# Patient Record
Sex: FEMALE | Race: WHITE | NOT HISPANIC OR LATINO | Employment: UNEMPLOYED | URBAN - METROPOLITAN AREA
[De-identification: names, ages, dates, MRNs, and addresses within clinical notes are randomized per-mention and may not be internally consistent; named-entity substitution may affect disease eponyms.]

---

## 2019-01-24 ENCOUNTER — OFFICE VISIT (OUTPATIENT)
Dept: MEDICAL GROUP | Facility: MEDICAL CENTER | Age: 44
End: 2019-01-24
Attending: FAMILY MEDICINE
Payer: COMMERCIAL

## 2019-01-24 VITALS
BODY MASS INDEX: 26.22 KG/M2 | SYSTOLIC BLOOD PRESSURE: 118 MMHG | OXYGEN SATURATION: 100 % | HEIGHT: 68 IN | WEIGHT: 173 LBS | HEART RATE: 78 BPM | RESPIRATION RATE: 16 BRPM | DIASTOLIC BLOOD PRESSURE: 80 MMHG | TEMPERATURE: 97.8 F

## 2019-01-24 DIAGNOSIS — R11.0 NAUSEA: ICD-10-CM

## 2019-01-24 DIAGNOSIS — Z72.0 TOBACCO ABUSE: ICD-10-CM

## 2019-01-24 DIAGNOSIS — G44.021 INTRACTABLE CHRONIC CLUSTER HEADACHE: ICD-10-CM

## 2019-01-24 DIAGNOSIS — G43.011 INTRACTABLE MIGRAINE WITHOUT AURA AND WITH STATUS MIGRAINOSUS: ICD-10-CM

## 2019-01-24 PROCEDURE — 99204 OFFICE O/P NEW MOD 45 MIN: CPT | Performed by: FAMILY MEDICINE

## 2019-01-24 PROCEDURE — 99202 OFFICE O/P NEW SF 15 MIN: CPT | Performed by: FAMILY MEDICINE

## 2019-01-24 RX ORDER — TRAMADOL HYDROCHLORIDE 50 MG/1
50-100 TABLET ORAL EVERY 8 HOURS PRN
Qty: 40 TAB | Refills: 0 | Status: SHIPPED | OUTPATIENT
Start: 2019-01-24 | End: 2019-01-31

## 2019-01-24 RX ORDER — IBUPROFEN 800 MG/1
400-800 TABLET ORAL EVERY 8 HOURS PRN
Qty: 60 TAB | Refills: 3 | Status: SHIPPED | OUTPATIENT
Start: 2019-01-24 | End: 2019-10-17

## 2019-01-24 RX ORDER — PROCHLORPERAZINE MALEATE 10 MG
10 TABLET ORAL EVERY 6 HOURS PRN
Qty: 30 TAB | Refills: 3 | Status: SHIPPED | OUTPATIENT
Start: 2019-01-24 | End: 2019-10-17

## 2019-01-24 RX ORDER — BUTALBITAL, ACETAMINOPHEN AND CAFFEINE 50; 325; 40 MG/1; MG/1; MG/1
1-2 TABLET ORAL EVERY 6 HOURS PRN
Qty: 60 TAB | Refills: 0 | Status: SHIPPED | OUTPATIENT
Start: 2019-01-24 | End: 2019-02-23

## 2019-01-24 ASSESSMENT — ENCOUNTER SYMPTOMS
TINGLING: 0
DEPRESSION: 0
PALPITATIONS: 0
TREMORS: 0
SPEECH CHANGE: 0
SPUTUM PRODUCTION: 0
NERVOUS/ANXIOUS: 1
INSOMNIA: 0
EYES NEGATIVE: 1
BACK PAIN: 0
HALLUCINATIONS: 0
CHILLS: 0
SHORTNESS OF BREATH: 0
SENSORY CHANGE: 0
SEIZURES: 0
HEADACHES: 1
VOMITING: 0
NAUSEA: 1
ABDOMINAL PAIN: 0
COUGH: 0
FOCAL WEAKNESS: 0
FEVER: 0
NECK PAIN: 0

## 2019-01-24 ASSESSMENT — PATIENT HEALTH QUESTIONNAIRE - PHQ9: CLINICAL INTERPRETATION OF PHQ2 SCORE: 0

## 2019-01-24 ASSESSMENT — LIFESTYLE VARIABLES: SUBSTANCE_ABUSE: 0

## 2019-01-24 NOTE — PROGRESS NOTES
Subjective:      Erna Garcia is a 43 y.o. female who presents with [facility] and Migraine            Patient 43-year-old female here to establish with the clinic today.    She has a history of intractable migraine headaches for which she had been having for the last 22 years.  Patient states that 22 years ago she had a viral meningitis while pregnant with her daughter and since then she has been having migraine headaches.  She had been seeing a neurologist for the management of her migraine headaches but has not found a medication that actually works to control her headaches.  She states that she had been placed on triptan's which worsened the intensity of the headaches.  Pain medications unless started before the headache occurred seemed to not have any effect on her headaches.  She has used Motrin with minimal success if she used multiple over-the-counter tablets.  Today we will order an MRI of her head to further assess the migraine.  She states the last MRI she had was about 20 years ago.  We will also refer to neurology for further assessment and also assistance in management.  In the meantime will have her try Fioricet as needed, Motrin 400-800 mg as needed, prochlorperazine as needed.  For breakthrough pain we will have her try tramadol since she has not tried tramadol in the past.  We will continue to follow.    Since we are ordering an MRI will also order blood work to further assess her metabolic function.  For her general health maintenance we will check a lipid panel, as well as a vitamin D level.  We will continue to follow.    She states she generally does not have any anxiety or depression, but when she is anticipating a headache she starts developing some degree of anxiety.  She does not wish to see a counselor at this point, and does not wish to be started on any medications for anxiety.  She is not having any urges to harm himself or others.  ER precautions have been given to patient.    She  has no past surgical history.    Her family medical history is significant for heart disease in her maternal grandfather and lung cancer in her maternal grandmother.    She is single but in a relationship and currently works as a .  She has a daughter.    She smokes tobacco, stopped drinking alcohol since wheat worsens her headaches, and denies other substance use.    Acute pain   This is a recurrent problem.   Patient is complaining of pain x 22 year(s) located in her headache.  Pain is intermittent, described as sharp, aching, squeezing, throbbing and a 9/10 on the pain scale.   Treatments tried include:NSAIDs, tylenol.     Is the pain medication improving the patient’s symptoms: No  Any adverse effects: No  Alcohol or illicit drug use:   She  reports that she drinks alcohol.  She  reports that she does not use drugs.     History of controlled substance used in a way other than prescribed? No     Any early refills of a controlled substance: No  History of lost or stolen controlled substance prescription: No  Any aberrant behavior or intoxication while on a controlled substance: No  Has the patient self-modified their dose or frequency of the medication :No  Compliant with treatment recommendations and plan: Yes  Any major health change to the patient: No  Concerns for misuse, abuse or addiction: No  /NarxCheck report reviewed: Yes  History of abnormal drug screening: No  I have assessed the patient’s risk for abuse, dependency, and addiction using the validated Opioid Risk Tool.     Opioid Risk Score: 1       Interpretation of Opioid Risk Score   Score 0-3 = Low risk of abuse. Do UDS at least once per year.  Score 4-7 = Moderate risk of abuse. Do UDS 1-4 times per year.  Score 8+ = High risk of abuse. Refer to specialist.     I have conducted a physical exam and documented findings.     I certify that I have obtained and reviewed her medical history. I have also made a good marek effort to obtain  "applicable records from other providers who have treated the patient.  I have reviewed the patient's prescription history as maintained by the [place] Prescription Monitoring Program.      Given the above, I believe the benefits of controlled substance therapy outweigh the risks. The reasons for prescribing controlled substances include non-narcotic, oral analgesic alternatives have been inadequate for pain control. Accordingly, I have discussed the risk and benefits, treatment plan, and alternative therapies with the patient. Patient was advised that this medicine is intended for short term (no more than 14 days)/intermittent use only and not intended to be an ongoing prescription.          Review of Systems   Constitutional: Negative for chills and fever.   HENT: Negative for hearing loss and tinnitus.    Eyes: Negative.         Light sensitivity with headache   Respiratory: Negative for cough, sputum production and shortness of breath.    Cardiovascular: Negative for chest pain and palpitations.   Gastrointestinal: Positive for nausea. Negative for abdominal pain and vomiting.   Genitourinary: Negative.    Musculoskeletal: Negative for back pain, joint pain and neck pain.   Skin: Negative for rash.   Neurological: Positive for headaches. Negative for tingling, tremors, sensory change, speech change, focal weakness and seizures.   Endo/Heme/Allergies: Negative.    Psychiatric/Behavioral: Negative for depression, hallucinations, substance abuse and suicidal ideas. The patient is nervous/anxious. The patient does not have insomnia.           Objective:     /80 (BP Location: Left arm, Patient Position: Sitting)   Pulse 78   Temp 36.6 °C (97.8 °F)   Resp 16   Ht 1.727 m (5' 8\")   Wt 78.5 kg (173 lb)   SpO2 100%   BMI 26.30 kg/m²      Physical Exam   Constitutional: She is oriented to person, place, and time. She appears well-developed and well-nourished.   HENT:   Head: Normocephalic and atraumatic. "   Nose: Nose normal.   Mouth/Throat: Oropharynx is clear and moist.   Eyes: Pupils are equal, round, and reactive to light. Conjunctivae and EOM are normal. Right eye exhibits no discharge. Left eye exhibits no discharge.   Neck: Normal range of motion. Neck supple. No thyromegaly present.   Cardiovascular: Normal rate, regular rhythm and normal heart sounds.  Exam reveals no friction rub.    No murmur heard.  Pulmonary/Chest: Effort normal and breath sounds normal. No respiratory distress. She has no wheezes. She has no rales.   Abdominal: Soft. Bowel sounds are normal. She exhibits no distension. There is no tenderness.   Musculoskeletal: Normal range of motion.   Lymphadenopathy:     She has no cervical adenopathy.   Neurological: She is alert and oriented to person, place, and time.   Skin: Skin is warm and dry.   Psychiatric: She has a normal mood and affect. Her behavior is normal.   Nursing note and vitals reviewed.              Assessment/Plan:     1. Intractable migraine without aura and with status migrainosus  We will have her referred to neurology and get an MRI of her brain.  For her headaches we will have her start Motrin at 400-800 mg every 8 hours as needed, prochlorperazine 10 mg every 6 hours as needed for her nausea, Fioricet every 6 hours as needed.  And tramadol for breakthrough pain.  ER precautions have been given to patient.  - REFERRAL TO NEUROLOGY  - MR-BRAIN-W/O; Future  - prochlorperazine (COMPAZINE) 10 MG Tab; Take 1 Tab by mouth every 6 hours as needed.  Dispense: 30 Tab; Refill: 3  - ibuprofen (MOTRIN) 800 MG Tab; Take 0.5-1 Tabs by mouth every 8 hours as needed.  Dispense: 60 Tab; Refill: 3  - tramadol (ULTRAM) 50 MG Tab; Take 1-2 Tabs by mouth every 8 hours as needed for up to 7 days.  Dispense: 40 Tab; Refill: 0  - acetaminophen/caffeine/butalbital 325-40-50 mg (FIORICET) -40 MG Tab; Take 1-2 Tabs by mouth every 6 hours as needed for Headache or Migraine for up to 30 days.   Dispense: 60 Tab; Refill: 0  - Consent for Opiate Prescription  - TSH WITH REFLEX TO FT4; Future  - COMP METABOLIC PANEL; Future  - Lipid Profile; Future  - CBC WITH DIFFERENTIAL; Future  - VITAMIN D,25 HYDROXY; Future  - Controlled Substance Treatment Agreement    2. Intractable chronic cluster headache  See above plan.  - REF[facility] NEUROLOG[facility]  [facility]  - prochlorperazine (COMPAZINE) 10 MG Tab; Take 1 Tab by mouth every 6 hours as needed.  Dispense: 30 Tab; Refill: 3  - ibuprofen (MOTRIN) 800 MG Tab; Take 0.5-1 Tabs by mouth every 8 hours as needed.  Dispense: 60 Tab; Refill: 3  - tramadol (ULTRAM) 50 MG Tab; Take 1-2 Tabs by mouth every 8 hours as needed for up to 7 days.  Dispense: 40 Tab; Refill: 0  - acetaminophen/caffeine/butalbital 325-40-50 mg (FIORICET) -40 MG Tab; Take 1-2 Tabs by mouth every 6 hours as needed for Headache or Migraine for up to 30 days.  Dispense: 60 Tab; Refill: 0  - Consent for Opiate Prescription  - TSH WITH REFLEX TO FT4; Future  - COMP METABOLIC PANEL; Future  - Lipid Profile; Future  - CBC WITH DIFFERENTIAL; Future  - VITAMIN D,25 HYDROXY; Future  - Controlled Substance Treatment Agreement    3. Nausea  See above plan.  - REFERRAL [facility]UROLOGY  [facility]  - prochlorperazine (COMPAZINE) 10 MG Tab; Take 1 Tab by mouth every 6 hours as needed.  Dispense: 30 Tab; Refill: 3  - ibuprofen (MOTRIN) 800 MG Tab; Take 0.5-1 Tabs by mouth every 8 hours as needed.  Dispense: 60 Tab; Refill: 3  - tramadol (ULTRAM) 50 MG Tab; Take 1-2 Tabs by mouth every 8 hours as needed for up to 7 days.  Dispense: 40 Tab; Refill: 0  - acetaminophen/caffeine/butalbital 325-40-50 mg (FIORICET) -40 MG Tab; Take 1-2 Tabs by mouth every 6 hours as needed for Headache or Migraine for up to 30 days.  Dispense: 60 Tab; Refill: 0  - Consent for Opiate Prescription  - TSH WITH REFLEX TO FT4; Future  - COMP METABOLIC PANEL; Future  - Lipid Profile; Future  - CBC WITH DIFFERENTIAL;  Future  - VITAMIN D,25 HYDROXY; Future

## 2019-01-28 ENCOUNTER — TELEPHONE (OUTPATIENT)
Dept: MEDICAL GROUP | Facility: MEDICAL CENTER | Age: 44
End: 2019-01-28

## 2019-01-28 NOTE — TELEPHONE ENCOUNTER
1. Caller Name: Erna Garcia                                           Call Back Number: [phone] (home) [phone] (work)        Patient approves a detailed voicemail message: yes    Patient called in to let us know that when she took her medication she blacked out for 9 hr dose not remember  any thing that happed but she sad her boyfriend sad she was up talking and everything. And the medication is not helping her headache. Wanting to know what to do.

## 2019-01-29 NOTE — TELEPHONE ENCOUNTER
As you know, she saw Dr Edmond as a new patient recently and was prescribed meds for her headaches.  She needs to be more specific as to what med she took  As he prescribed  Fioricet  Compazine  Motrin  Tramadol  Then I can have a better ability to adjust or change medications.    She should schedule the MRI Brain w/o that Dr Edmond ordered.    If her headaches are worse or not doing well, she should go to ER.    She was approved to see Neurology:  [facility]  Ph: [phone]    Hira

## 2019-01-29 NOTE — TELEPHONE ENCOUNTER
Called patient and found out that she is taking all pain medications for her migraines and nothing is working. I advised her to go to the er to get advanced treatment. I also gave her the numbers to schedule for mri and neurology.

## 2019-02-26 ENCOUNTER — TELEPHONE (OUTPATIENT)
Dept: MEDICAL GROUP | Facility: MEDICAL CENTER | Age: 44
End: 2019-02-26

## 2019-02-26 DIAGNOSIS — G43.011 INTRACTABLE MIGRAINE WITHOUT AURA AND WITH STATUS MIGRAINOSUS: ICD-10-CM

## 2019-02-26 RX ORDER — BUTALBITAL, ACETAMINOPHEN AND CAFFEINE 50; 325; 40 MG/1; MG/1; MG/1
1-2 TABLET ORAL EVERY 4 HOURS PRN
Qty: 60 TAB | Refills: 0 | Status: SHIPPED | OUTPATIENT
Start: 2019-02-26 | End: 2019-03-28

## 2019-02-26 NOTE — TELEPHONE ENCOUNTER
She needs to schedule an appt with neurology. Can try fioricet as needed if she would like.   Thanks

## 2019-02-26 NOTE — TELEPHONE ENCOUNTER
"1. Caller Name: Erna Garcia                                           Call Back Number: [phone] (home) [phone] (work)        Patient approves a detailed voicemail message: N\A    Patient called very frustrated. She stated that her mri was denied and \"nobody wants to help her.\" She is asking if there is anything else we can do to help her migraines. Please advise.    "

## 2019-04-17 ENCOUNTER — HOSPITAL ENCOUNTER (EMERGENCY)
Facility: MEDICAL CENTER | Age: 44
End: 2019-04-17
Attending: EMERGENCY MEDICINE
Payer: COMMERCIAL

## 2019-04-17 VITALS
HEART RATE: 94 BPM | WEIGHT: 171.96 LBS | TEMPERATURE: 98 F | OXYGEN SATURATION: 95 % | SYSTOLIC BLOOD PRESSURE: 168 MMHG | RESPIRATION RATE: 18 BRPM | BODY MASS INDEX: 26.06 KG/M2 | HEIGHT: 68 IN | DIASTOLIC BLOOD PRESSURE: 70 MMHG

## 2019-04-17 DIAGNOSIS — E86.0 DEHYDRATION: ICD-10-CM

## 2019-04-17 DIAGNOSIS — Z72.0 TOBACCO ABUSE: ICD-10-CM

## 2019-04-17 DIAGNOSIS — F19.939 WITHDRAWAL FROM OTHER PSYCHOACTIVE SUBSTANCE (HCC): ICD-10-CM

## 2019-04-17 DIAGNOSIS — F15.10 METHAMPHETAMINE ABUSE (HCC): ICD-10-CM

## 2019-04-17 DIAGNOSIS — Z86.59 HISTORY OF RECENT STRESSFUL LIFE EVENT: ICD-10-CM

## 2019-04-17 DIAGNOSIS — F41.9 ANXIETY: ICD-10-CM

## 2019-04-17 LAB
ALBUMIN SERPL BCP-MCNC: 3.8 G/DL (ref 3.2–4.9)
ALBUMIN/GLOB SERPL: 1.4 G/DL
ALP SERPL-CCNC: 56 U/L (ref 30–99)
ALT SERPL-CCNC: 8 U/L (ref 2–50)
ANION GAP SERPL CALC-SCNC: 9 MMOL/L (ref 0–11.9)
AST SERPL-CCNC: 12 U/L (ref 12–45)
BASOPHILS # BLD AUTO: 0.9 % (ref 0–1.8)
BASOPHILS # BLD: 0.08 K/UL (ref 0–0.12)
BILIRUB SERPL-MCNC: 0.3 MG/DL (ref 0.1–1.5)
BUN SERPL-MCNC: 10 MG/DL (ref 8–22)
CALCIUM SERPL-MCNC: 8.8 MG/DL (ref 8.5–10.5)
CHLORIDE SERPL-SCNC: 104 MMOL/L (ref 96–112)
CO2 SERPL-SCNC: 24 MMOL/L (ref 20–33)
CREAT SERPL-MCNC: 0.72 MG/DL (ref 0.5–1.4)
EKG IMPRESSION: NORMAL
EOSINOPHIL # BLD AUTO: 0.3 K/UL (ref 0–0.51)
EOSINOPHIL NFR BLD: 3.2 % (ref 0–6.9)
ERYTHROCYTE [DISTWIDTH] IN BLOOD BY AUTOMATED COUNT: 45.2 FL (ref 35.9–50)
GLOBULIN SER CALC-MCNC: 2.7 G/DL (ref 1.9–3.5)
GLUCOSE SERPL-MCNC: 131 MG/DL (ref 65–99)
HCT VFR BLD AUTO: 41.5 % (ref 37–47)
HGB BLD-MCNC: 13.9 G/DL (ref 12–16)
IMM GRANULOCYTES # BLD AUTO: 0.02 K/UL (ref 0–0.11)
IMM GRANULOCYTES NFR BLD AUTO: 0.2 % (ref 0–0.9)
LIPASE SERPL-CCNC: 22 U/L (ref 11–82)
LYMPHOCYTES # BLD AUTO: 3.23 K/UL (ref 1–4.8)
LYMPHOCYTES NFR BLD: 34.6 % (ref 22–41)
MAGNESIUM SERPL-MCNC: 2 MG/DL (ref 1.5–2.5)
MCH RBC QN AUTO: 31.3 PG (ref 27–33)
MCHC RBC AUTO-ENTMCNC: 33.5 G/DL (ref 33.6–35)
MCV RBC AUTO: 93.5 FL (ref 81.4–97.8)
MONOCYTES # BLD AUTO: 0.7 K/UL (ref 0–0.85)
MONOCYTES NFR BLD AUTO: 7.5 % (ref 0–13.4)
NEUTROPHILS # BLD AUTO: 5.01 K/UL (ref 2–7.15)
NEUTROPHILS NFR BLD: 53.6 % (ref 44–72)
NRBC # BLD AUTO: 0 K/UL
NRBC BLD-RTO: 0 /100 WBC
PLATELET # BLD AUTO: 396 K/UL (ref 164–446)
PMV BLD AUTO: 8.3 FL (ref 9–12.9)
POTASSIUM SERPL-SCNC: 3.3 MMOL/L (ref 3.6–5.5)
PROT SERPL-MCNC: 6.5 G/DL (ref 6–8.2)
RBC # BLD AUTO: 4.44 M/UL (ref 4.2–5.4)
SODIUM SERPL-SCNC: 137 MMOL/L (ref 135–145)
WBC # BLD AUTO: 9.3 K/UL (ref 4.8–10.8)

## 2019-04-17 PROCEDURE — 700111 HCHG RX REV CODE 636 W/ 250 OVERRIDE (IP): Performed by: EMERGENCY MEDICINE

## 2019-04-17 PROCEDURE — 85025 COMPLETE CBC W/AUTO DIFF WBC: CPT

## 2019-04-17 PROCEDURE — 99285 EMERGENCY DEPT VISIT HI MDM: CPT

## 2019-04-17 PROCEDURE — 83690 ASSAY OF LIPASE: CPT

## 2019-04-17 PROCEDURE — 96374 THER/PROPH/DIAG INJ IV PUSH: CPT

## 2019-04-17 PROCEDURE — 80053 COMPREHEN METABOLIC PANEL: CPT

## 2019-04-17 PROCEDURE — 83735 ASSAY OF MAGNESIUM: CPT

## 2019-04-17 PROCEDURE — 700105 HCHG RX REV CODE 258: Performed by: EMERGENCY MEDICINE

## 2019-04-17 PROCEDURE — 93005 ELECTROCARDIOGRAM TRACING: CPT | Performed by: EMERGENCY MEDICINE

## 2019-04-17 RX ORDER — SODIUM CHLORIDE, SODIUM LACTATE, POTASSIUM CHLORIDE, CALCIUM CHLORIDE 600; 310; 30; 20 MG/100ML; MG/100ML; MG/100ML; MG/100ML
1000 INJECTION, SOLUTION INTRAVENOUS ONCE
Status: COMPLETED | OUTPATIENT
Start: 2019-04-17 | End: 2019-04-17

## 2019-04-17 RX ORDER — LORAZEPAM 2 MG/ML
1 INJECTION INTRAMUSCULAR ONCE
Status: COMPLETED | OUTPATIENT
Start: 2019-04-17 | End: 2019-04-17

## 2019-04-17 RX ORDER — ONDANSETRON 4 MG/1
4 TABLET, ORALLY DISINTEGRATING ORAL EVERY 8 HOURS PRN
Qty: 9 TAB | Refills: 0 | Status: SHIPPED | OUTPATIENT
Start: 2019-04-17 | End: 2019-04-20

## 2019-04-17 RX ADMIN — LORAZEPAM 1 MG: 2 INJECTION INTRAMUSCULAR; INTRAVENOUS at 02:09

## 2019-04-17 RX ADMIN — SODIUM CHLORIDE, POTASSIUM CHLORIDE, SODIUM LACTATE AND CALCIUM CHLORIDE 1000 ML: 600; 310; 30; 20 INJECTION, SOLUTION INTRAVENOUS at 02:10

## 2019-04-17 NOTE — ED TRIAGE NOTES
"Erna Garcia  43 y.o.  Chief Complaint   Patient presents with   • Detox     wants to quit using meth, states her bf is here in ICU for something meth related and she wants to quit, last used 3-4 days ago     Patient ambulatory with steady gait to triage room with above complaint.  Patient appears anxious and tearful.  States has a headache and feels \"hot and cold\".  Denies any other drugs or alcohol use.      Patient escorted to the lobby and instructed to notify staff of any changes in condition.      "

## 2019-04-17 NOTE — ED NOTES
Patient given resources by ASHER, candida instructions and prescription. She ambulated out of department with steady gait.

## 2019-04-17 NOTE — ED PROVIDER NOTES
ED Provider Note    CHIEF COMPLAINT  Chief Complaint   Patient presents with   • Detox     wants to quit using meth, states her bf is here in ICU for something meth related and she wants to quit, last used 3-4 days ago       HPI    Primary care provider: Emmanuel Edmond M.D.  Means of arrival: Walk-in  History obtained from: Patient  History limited by: Nothing    Erna Garcia is a 43 y.o. female who presents with concerns that she is withdrawing from methamphetamine.  The patient has been abusing meth on and off for the last 20 years.  She is been using very heavily over the last month.  Her boyfriend unfortunately had some serious complications for meth abuse and is currently in the intensive care unit.  Her last use was 3 days ago, and she is experiencing nausea and anxiety.  She is asking for help and since sedating from methamphetamine.  She has not attempted any alleviating measures.  No noted aggravating factors.  She globally just feels unwell and tired and achy all over.  Symptoms have been constant and worsening since her last use of methamphetamine.  She is not suicidal and she hopes to get better and stop abusing drugs.    REVIEW OF SYSTEMS  Constitutional: Negative for fever or chills.  Positive for body aches.  HENT: Negative for rhinorrhea or sore throat.    Respiratory: Negative for cough or shortness of breath.    Cardiovascular: Negative for chest pain or palpitations.   Gastrointestinal: Positive for nausea but no vomiting or abdominal pain.  Genitourinary: Negative for dysuria or flank pain.   Musculoskeletal: Negative for back pain or focal joint pain.   Skin: Negative for itching or rash.   Neurological: Negative for sensory or motor changes.   Endo/Heme/Allergies: Negative for weight changes or hives.   Psychiatric/Behavioral: Positive for some mood but not suicidal, she wants to get better and stop abusing drugs.  See HPI for further details. All other systems are negative.     PAST MEDICAL  "HISTORY   has a past medical history of Anxiety and Head ache.    PAST FAMILY HISTORY  Family History   Problem Relation Age of Onset   • Cancer Maternal Grandmother    • Heart Disease Maternal Grandfather        SOCIAL HISTORY  Social History     Social History Main Topics   • Smoking status: Current Every Day Smoker     Packs/day: 1.00     Types: Cigarettes   • Smokeless tobacco: Never Used   • Alcohol use Yes      Comment: socially   • Drug use: Yes     Types: Inhaled      Comment: meth   • Sexual activity: Not on file       SURGICAL HISTORY   has a past surgical history that includes hysterectomy laparoscopy; tubal ligation; and wrist orif.    CURRENT MEDICATIONS  Home Medications     Reviewed by Carlos Chou R.N. (Registered Nurse) on 04/17/19 at 0113  Med List Status: Complete   Medication Last Dose Status   ibuprofen (MOTRIN) 800 MG Tab  Active   prochlorperazine (COMPAZINE) 10 MG Tab  Active                ALLERGIES  Allergies   Allergen Reactions   • Aspirin      Swelling        PHYSICAL EXAM  VITAL SIGNS: BP (!) 168/70   Pulse 94   Temp 36.7 °C (98 °F) (Temporal)   Resp 18   Ht 1.727 m (5' 8\")   Wt 78 kg (171 lb 15.3 oz)   SpO2 95%   BMI 26.15 kg/m²    Pulse ox interpretation: On room air, I interpret this pulse ox as normal.  Constitutional: Unkempt, lying on stretcher in mild distress.  HEENT: Normocephalic, atraumatic. Posterior pharynx clear, mucous membranes dry.  Eyes:  EOMI. Normal sclerae.  Neck: Supple, nontender.  Chest/Pulmonary: Clear to ausculation bilaterally, no wheezes or rhonchi.  Cardiovascular: Tachycardic, regular rhythm, no murmurs.  Abdomen: Soft, nontender; no rebound, guarding, or masses.  Back: No CVA or midline tenderness.   Musculoskeletal: No deformity or edema.  Neuro: Clear speech, normal coordination, cranial nerves II-XII grossly intact, no focal asymmetry or sensory deficits.   Psych: Sad mood, flat affect, but cooperative and not suicidal.  Skin: No rashes, " warm and dry.      DIAGNOSTIC STUDIES / PROCEDURES    LABS & EKG  Results for orders placed or performed during the hospital encounter of 04/17/19   CBC WITH DIFFERENTIAL   Result Value Ref Range    WBC 9.3 4.8 - 10.8 K/uL    RBC 4.44 4.20 - 5.40 M/uL    Hemoglobin 13.9 12.0 - 16.0 g/dL    Hematocrit 41.5 37.0 - 47.0 %    MCV 93.5 81.4 - 97.8 fL    MCH 31.3 27.0 - 33.0 pg    MCHC 33.5 (L) 33.6 - 35.0 g/dL    RDW 45.2 35.9 - 50.0 fL    Platelet Count 396 164 - 446 K/uL    MPV 8.3 (L) 9.0 - 12.9 fL    Neutrophils-Polys 53.60 44.00 - 72.00 %    Lymphocytes 34.60 22.00 - 41.00 %    Monocytes 7.50 0.00 - 13.40 %    Eosinophils 3.20 0.00 - 6.90 %    Basophils 0.90 0.00 - 1.80 %    Immature Granulocytes 0.20 0.00 - 0.90 %    Nucleated RBC 0.00 /100 WBC    Neutrophils (Absolute) 5.01 2.00 - 7.15 K/uL    Lymphs (Absolute) 3.23 1.00 - 4.80 K/uL    Monos (Absolute) 0.70 0.00 - 0.85 K/uL    Eos (Absolute) 0.30 0.00 - 0.51 K/uL    Baso (Absolute) 0.08 0.00 - 0.12 K/uL    Immature Granulocytes (abs) 0.02 0.00 - 0.11 K/uL    NRBC (Absolute) 0.00 K/uL   COMP METABOLIC PANEL   Result Value Ref Range    Sodium 137 135 - 145 mmol/L    Potassium 3.3 (L) 3.6 - 5.5 mmol/L    Chloride 104 96 - 112 mmol/L    Co2 24 20 - 33 mmol/L    Anion Gap 9.0 0.0 - 11.9    Glucose 131 (H) 65 - 99 mg/dL    Bun 10 8 - 22 mg/dL    Creatinine 0.72 0.50 - 1.40 mg/dL    Calcium 8.8 8.5 - 10.5 mg/dL    AST(SGOT) 12 12 - 45 U/L    ALT(SGPT) 8 2 - 50 U/L    Alkaline Phosphatase 56 30 - 99 U/L    Total Bilirubin 0.3 0.1 - 1.5 mg/dL    Albumin 3.8 3.2 - 4.9 g/dL    Total Protein 6.5 6.0 - 8.2 g/dL    Globulin 2.7 1.9 - 3.5 g/dL    A-G Ratio 1.4 g/dL   LIPASE   Result Value Ref Range    Lipase 22 11 - 82 U/L   MAGNESIUM   Result Value Ref Range    Magnesium 2.0 1.5 - 2.5 mg/dL   ESTIMATED GFR   Result Value Ref Range    GFR If African American >60 >60 mL/min/1.73 m 2    GFR If Non African American >60 >60 mL/min/1.73 m 2   EKG (NOW)   Result Value Ref Range     Report       [facility] Emergency Dept.    Test Date:  2019  Pt Name:    LORELEI BROWNING                  Department: ER  MRN:        [id number]                      Room:        36  Gender:     Female                       Technician: [id number]  :        [id number]                   Requested By:PIETRO DAILEY  Order #:    [id number]                    Reading MD: Pietro Dailey MD    Measurements  Intervals                                Axis  Rate:       98                           P:          78  MT:         168                          QRS:        76  QRSD:       90                           T:          54  QT:         368  QTc:        470    Interpretive Statements  SINUS RHYTHM  No previous ECG available for comparison  No STEMI or strain or dysrhythmia  Electronically Signed On 2019 19:43:59 PDT by Pietro Dailey MD             COURSE & MEDICAL DECISION MAKING    This is a 43 y.o. female who presents with nausea and generally not feeling well in the setting of stopping chronic meth abuse.  Tachycardic otherwise stable vital signs.    Differential Diagnosis includes but is not limited to:  Drug withdrawal, dehydration, electrolyte abnormality, substance abuse, dysrhythmia, endocarditis    ED Course:  Given the patient is tachycardic I plan to evaluate her for any possible electrolyte abnormality or infection, and we will also obtain an EKG.  We will keep her n.p.o. for the time being in case a concerning surgical process is present and treat her with an IV fluid crystalloid bolus.  Ativan for symptom control.    Thankfully, the patient's workup is reassuring.  She has no fever white count normal doubt severe infection.  She is not anemic.  Electrolytes are stable without acidosis.  LFTs and lipase are normal doubt hepatobiliary disease.  EKG without STEMI or strain or dysrhythmia.    On recheck the patient is resting comfortably after benzos and IV fluids, vitals are normal  thus I feel she has had a positive response to parenteral rehydration.   met with the patient and provided outpatient substance abuse resources.  I have also given her outpatient clinic information to provide routine health care and substance abuse resources.  She is resting comfortably and not vomiting, normal vital signs, soft abdomen, well-appearing, stable for discharge.  She will return immediately for any new or worsening symptoms.    Medications   LORazepam (ATIVAN) injection 1 mg (1 mg Intravenous Given 4/17/19 0209)   lactated ringers infusion (BOLUS) (0 mL Intravenous Stopped 4/17/19 0249)       FINAL IMPRESSION  1. Methamphetamine abuse (HCC)    2. Dehydration    3. Tobacco abuse    4. Withdrawal from other psychoactive substance (HCC)    5. Anxiety    6. History of recent stressful life event        PRESCRIPTIONS  Discharge Medication List as of 4/17/2019  4:07 AM      START taking these medications    Details   ondansetron (ZOFRAN ODT) 4 MG TABLET DISPERSIBLE Take 1 Tab by mouth every 8 hours as needed for Nausea for up to 3 days., Disp-9 Tab, R-0, Print Rx Paper             FOLLOW UP  Emmanuel Edmond M.D.  [address] [id number] [phone]    Schedule an appointment as soon as possible for a visit in 1 day      [facility]  [address] [address]  [phone]  Go today      [facility], Emergency Dept  [address] [facility] [address] [phone]  Today  If you have ANY new or worse symptoms!        -DISCHARGE-     Results, exam findings, clinical impression, presumed diagnosis, treatment options, and strict return precautions were discussed with the patient, and they verbalized understanding, agreed with, and appreciated the plan of care.    Pertinent Labs & Imaging studies reviewed and verified by myself, as well as nursing notes and the patient's past medical, family, and social histories (See chart for  details).    The patient is referred to a primary physician for a follow-up of today's complaint, substance abuse counseling, blood pressure management, diabetic screening, and for all other preventative health concerns.     Portions of this record were made with voice recognition software.  Despite my review, spelling/grammar/context errors may still remain.  Interpretation of this chart should be taken in this context.    Electronically signed by Pietro Hamilton on 4/17/2019 at 7:48 PM.

## 2019-04-17 NOTE — DISCHARGE PLANNING
Medical Social Work    MSW received a call from bedside RN that pt is in need of detox resources.  MSW met with pt at bedside and provided her with a list of local substance abuse resources.  RN updated.

## 2019-04-17 NOTE — DISCHARGE INSTRUCTIONS
You were seen and evaluated in the Emergency Department at [facility] for:     Not feeling well after stopping methamphetamines.     You had the following tests and studies:    Thankfully your bloodwork today is stable!    You received the following medications:    IV fluids    You received the following prescriptions:    Ondansetron for nausea  ----------------------------    Please make sure to follow up with:    [facility] or your PCP for recheck and routine health care, and please utilize the resources provided.   ----------------------------    We always encourage patients to return IMMEDIATELY if they have:  Increased or changing pain, passing out, fevers over 100.4 (taken in your mouth or rectally) for more than 2 days, redness or swelling of skin or tissues, feeling like your heart is beating fast, chest pain that is new or worsening, trouble breathing, feeling like your throat is closing up and can not breath, inability to walk, weakness of any part of your body, new dizziness, severe bleeding that won't stop from any part of your body, if you can't eat or drink, or if you have any other concerns.   If you feel worse, please know that you can always return with any questions, concerns, worse symptoms, or you are feeling unsafe. We certainly cannot say for sure that we have ruled out every illness or dangerous disease, but we feel that at this specific time, your exam, tests, and vital signs like heart rate and blood pressure are safe for discharge.

## 2019-07-31 ENCOUNTER — HOSPITAL ENCOUNTER (EMERGENCY)
Facility: MEDICAL CENTER | Age: 44
End: 2019-07-31
Attending: EMERGENCY MEDICINE
Payer: COMMERCIAL

## 2019-07-31 ENCOUNTER — APPOINTMENT (OUTPATIENT)
Dept: RADIOLOGY | Facility: MEDICAL CENTER | Age: 44
End: 2019-07-31
Attending: EMERGENCY MEDICINE
Payer: COMMERCIAL

## 2019-07-31 VITALS
RESPIRATION RATE: 16 BRPM | OXYGEN SATURATION: 95 % | BODY MASS INDEX: 26.06 KG/M2 | WEIGHT: 171.96 LBS | SYSTOLIC BLOOD PRESSURE: 106 MMHG | DIASTOLIC BLOOD PRESSURE: 78 MMHG | HEART RATE: 70 BPM | HEIGHT: 68 IN | TEMPERATURE: 98.1 F

## 2019-07-31 DIAGNOSIS — R11.2 NON-INTRACTABLE VOMITING WITH NAUSEA, UNSPECIFIED VOMITING TYPE: ICD-10-CM

## 2019-07-31 DIAGNOSIS — G44.209 TENSION HEADACHE: ICD-10-CM

## 2019-07-31 LAB
ALBUMIN SERPL BCP-MCNC: 4.1 G/DL (ref 3.2–4.9)
ALBUMIN/GLOB SERPL: 1.6 G/DL
ALP SERPL-CCNC: 56 U/L (ref 30–99)
ALT SERPL-CCNC: 5 U/L (ref 2–50)
ANION GAP SERPL CALC-SCNC: 5 MMOL/L (ref 0–11.9)
AST SERPL-CCNC: 16 U/L (ref 12–45)
BASOPHILS # BLD AUTO: 0.8 % (ref 0–1.8)
BASOPHILS # BLD: 0.08 K/UL (ref 0–0.12)
BILIRUB SERPL-MCNC: 0.4 MG/DL (ref 0.1–1.5)
BUN SERPL-MCNC: 15 MG/DL (ref 8–22)
CALCIUM SERPL-MCNC: 8.6 MG/DL (ref 8.5–10.5)
CHLORIDE SERPL-SCNC: 106 MMOL/L (ref 96–112)
CO2 SERPL-SCNC: 28 MMOL/L (ref 20–33)
CREAT SERPL-MCNC: 1.1 MG/DL (ref 0.5–1.4)
EOSINOPHIL # BLD AUTO: 0.44 K/UL (ref 0–0.51)
EOSINOPHIL NFR BLD: 4.3 % (ref 0–6.9)
ERYTHROCYTE [DISTWIDTH] IN BLOOD BY AUTOMATED COUNT: 42.6 FL (ref 35.9–50)
GLOBULIN SER CALC-MCNC: 2.5 G/DL (ref 1.9–3.5)
GLUCOSE SERPL-MCNC: 91 MG/DL (ref 65–99)
HCG SERPL QL: NEGATIVE
HCT VFR BLD AUTO: 41 % (ref 37–47)
HGB BLD-MCNC: 14 G/DL (ref 12–16)
IMM GRANULOCYTES # BLD AUTO: 0.03 K/UL (ref 0–0.11)
IMM GRANULOCYTES NFR BLD AUTO: 0.3 % (ref 0–0.9)
LYMPHOCYTES # BLD AUTO: 3.41 K/UL (ref 1–4.8)
LYMPHOCYTES NFR BLD: 33.1 % (ref 22–41)
MCH RBC QN AUTO: 31.3 PG (ref 27–33)
MCHC RBC AUTO-ENTMCNC: 34.1 G/DL (ref 33.6–35)
MCV RBC AUTO: 91.5 FL (ref 81.4–97.8)
MONOCYTES # BLD AUTO: 0.72 K/UL (ref 0–0.85)
MONOCYTES NFR BLD AUTO: 7 % (ref 0–13.4)
NEUTROPHILS # BLD AUTO: 5.62 K/UL (ref 2–7.15)
NEUTROPHILS NFR BLD: 54.5 % (ref 44–72)
NRBC # BLD AUTO: 0 K/UL
NRBC BLD-RTO: 0 /100 WBC
PLATELET # BLD AUTO: 325 K/UL (ref 164–446)
PMV BLD AUTO: 8.5 FL (ref 9–12.9)
POTASSIUM SERPL-SCNC: 4.3 MMOL/L (ref 3.6–5.5)
PROT SERPL-MCNC: 6.6 G/DL (ref 6–8.2)
RBC # BLD AUTO: 4.48 M/UL (ref 4.2–5.4)
SODIUM SERPL-SCNC: 139 MMOL/L (ref 135–145)
WBC # BLD AUTO: 10.3 K/UL (ref 4.8–10.8)

## 2019-07-31 PROCEDURE — 99285 EMERGENCY DEPT VISIT HI MDM: CPT

## 2019-07-31 PROCEDURE — 96375 TX/PRO/DX INJ NEW DRUG ADDON: CPT

## 2019-07-31 PROCEDURE — 80053 COMPREHEN METABOLIC PANEL: CPT

## 2019-07-31 PROCEDURE — 96374 THER/PROPH/DIAG INJ IV PUSH: CPT

## 2019-07-31 PROCEDURE — 84703 CHORIONIC GONADOTROPIN ASSAY: CPT

## 2019-07-31 PROCEDURE — 96376 TX/PRO/DX INJ SAME DRUG ADON: CPT

## 2019-07-31 PROCEDURE — 70450 CT HEAD/BRAIN W/O DYE: CPT

## 2019-07-31 PROCEDURE — 700111 HCHG RX REV CODE 636 W/ 250 OVERRIDE (IP): Performed by: EMERGENCY MEDICINE

## 2019-07-31 PROCEDURE — 36415 COLL VENOUS BLD VENIPUNCTURE: CPT

## 2019-07-31 PROCEDURE — 85025 COMPLETE CBC W/AUTO DIFF WBC: CPT

## 2019-07-31 RX ORDER — LORAZEPAM 2 MG/ML
0.5 INJECTION INTRAMUSCULAR ONCE
Status: COMPLETED | OUTPATIENT
Start: 2019-07-31 | End: 2019-07-31

## 2019-07-31 RX ORDER — CYCLOBENZAPRINE HCL 5 MG
5-10 TABLET ORAL 3 TIMES DAILY PRN
Qty: 30 TAB | Refills: 0 | Status: SHIPPED | OUTPATIENT
Start: 2019-07-31 | End: 2019-10-17

## 2019-07-31 RX ORDER — ONDANSETRON 4 MG/1
4 TABLET, ORALLY DISINTEGRATING ORAL EVERY 8 HOURS PRN
Qty: 20 TAB | Refills: 0 | Status: SHIPPED | OUTPATIENT
Start: 2019-07-31 | End: 2019-10-17

## 2019-07-31 RX ORDER — KETOROLAC TROMETHAMINE 30 MG/ML
30 INJECTION, SOLUTION INTRAMUSCULAR; INTRAVENOUS ONCE
Status: COMPLETED | OUTPATIENT
Start: 2019-07-31 | End: 2019-07-31

## 2019-07-31 RX ORDER — ONDANSETRON 2 MG/ML
4 INJECTION INTRAMUSCULAR; INTRAVENOUS ONCE
Status: COMPLETED | OUTPATIENT
Start: 2019-07-31 | End: 2019-07-31

## 2019-07-31 RX ORDER — HYDROMORPHONE HYDROCHLORIDE 1 MG/ML
1 INJECTION, SOLUTION INTRAMUSCULAR; INTRAVENOUS; SUBCUTANEOUS
Status: DISCONTINUED | OUTPATIENT
Start: 2019-07-31 | End: 2019-08-01 | Stop reason: HOSPADM

## 2019-07-31 RX ADMIN — HYDROMORPHONE HYDROCHLORIDE 1 MG: 1 INJECTION, SOLUTION INTRAMUSCULAR; INTRAVENOUS; SUBCUTANEOUS at 21:48

## 2019-07-31 RX ADMIN — KETOROLAC TROMETHAMINE 30 MG: 30 INJECTION, SOLUTION INTRAMUSCULAR at 23:29

## 2019-07-31 RX ADMIN — HYDROMORPHONE HYDROCHLORIDE 1 MG: 1 INJECTION, SOLUTION INTRAMUSCULAR; INTRAVENOUS; SUBCUTANEOUS at 20:29

## 2019-07-31 RX ADMIN — ONDANSETRON 4 MG: 2 INJECTION INTRAMUSCULAR; INTRAVENOUS at 20:29

## 2019-07-31 RX ADMIN — LORAZEPAM 0.5 MG: 2 INJECTION INTRAMUSCULAR; INTRAVENOUS at 23:29

## 2019-07-31 ASSESSMENT — LIFESTYLE VARIABLES: DO YOU DRINK ALCOHOL: NO

## 2019-08-01 NOTE — ED PROVIDER NOTES
ED Provider Note    Scribed for Joshua Purvis M.D. by Lucero Navarro. 7/31/2019  8:06 PM    Primary care provider: Emmanuel Edmond M.D.  Means of arrival: EMS  History obtained from: Patient  History limited by: None    CHIEF COMPLAINT  Chief Complaint   Patient presents with   • Headache   • N/V       HPI  Erna Garcia is a 44 y.o. Female, with a history of headaches, who presents to the Emergency Department for a chief complaint of a acute, constant headache. The patient reports that she was at work when her headache began. She notes that her headache originates from her neck and radiates to the head. Endorses abdominal pain, nausea and bloody vomiting onset one hour ago. She notes that her headache began after she vomited. The patient reports that her vomit had the presence of streaks of blood. Denies painful urination diarrhea, numbness, tingling or weakness. She denies ingestion of new foods. She is allergic to aspirin.     REVIEW OF SYSTEMS  Pertinent positives include headache, neck pain, abdominal pain, nausea and bloody vomiting. Pertinent negatives include no painful urination diarrhea, numbness, tingling or weakness.  All other systems reviewed and negative.    PAST MEDICAL HISTORY   has a past medical history of Anxiety and Headache.    SURGICAL HISTORY  The patient has a past surgical history that includes hysterectomy laparoscopy; tubal ligation; and wrist orif.    SOCIAL HISTORY  Social History     Tobacco Use   • Smoking status: Current Every Day Smoker     Packs/day: 1.00     Types: Cigarettes   • Smokeless tobacco: Never Used   Substance Use Topics   • Alcohol use: Yes     Comment: socially   • Drug use: Yes     Types: Inhaled     Comment: meth      Social History     Substance and Sexual Activity   Drug Use Yes   • Types: Inhaled    Comment: meth       FAMILY HISTORY  Family History   Problem Relation Age of Onset   • Cancer Maternal Grandmother    • Heart Disease Maternal Grandfather        CURRENT  "MEDICATIONS  Current Outpatient Medications:   •  prochlorperazine (COMPAZINE) 10 MG Tab, Take 1 Tab by mouth every 6 hours as needed., Disp: 30 Tab, Rfl: 3  •  ibuprofen (MOTRIN) 800 MG Tab, Take 0.5-1 Tabs by mouth every 8 hours as needed., Disp: 60 Tab, Rfl: 3    ALLERGIES  Allergies   Allergen Reactions   • Aspirin      Swelling        PHYSICAL EXAM  VITAL SIGNS: /76   Pulse 62   Temp 36.7 °C (98.1 °F) (Oral)   Resp 18   Ht 1.727 m (5' 8\")   Wt 78 kg (171 lb 15.3 oz)   SpO2 99%   BMI 26.15 kg/m²     Constitutional: Well developed, Well nourished, Mild distress, Non-toxic appearance.   HENT: Tenderness to palpation bilateral paraspinal musculature at insertion to the occiput. Normocephalic, Atraumatic, Bilateral external ears normal, Oropharynx moist, No oral exudates.   Eyes: PERRLA, EOMI, Conjunctiva normal, No discharge.   Neck: No tenderness, Supple, No stridor.   Lymphatic: No lymphadenopathy noted.   Cardiovascular: Normal heart rate, Normal rhythm.   Thorax & Lungs: Clear to auscultation bilaterally, No respiratory distress, No wheezing, No crackles.   Abdomen: Soft, No tenderness, No masses, No pulsatile masses.   Skin: Warm, Dry, No erythema, No rash.   Extremities:, No edema No cyanosis.   Musculoskeletal: No tenderness to palpation or major deformities noted.  Intact distal pulses  Neurologic: Awake, alert. Cranial nerves 2-11 intact, muscle strength 5/5 in bilateral upper and lower extremitiesAwake, alert. Moves all extremities spontaneously.  Psychiatric: Affect normal, Judgment normal, Mood normal.       LABS  Labs Reviewed   CBC WITH DIFFERENTIAL - Abnormal; Notable for the following components:       Result Value    MPV 8.5 (*)     All other components within normal limits   ESTIMATED GFR - Abnormal; Notable for the following components:    GFR If Non  54 (*)     All other components within normal limits   HCG QUAL SERUM   COMP METABOLIC PANEL     All labs reviewed by " me.      RADIOLOGY  CT-HEAD W/O   Final Result      No acute intracranial abnormality is identified.        The radiologist's interpretation of all radiological studies have been reviewed by me.      COURSE & MEDICAL DECISION MAKING  Pertinent Labs & Imaging studies reviewed. (See chart for details)    I reviewed the patient's medical records which showed that the patient has  history of anxiety, and methamphetamine use. She was seen in January of 2019 by her PCP for her migraines.     8:06 PM - Patient seen and examined at bedside. Patient will be treated with Dilaudid injection 1 mg and Zofran 4 mg. Ordered CBC with differential, CMP, HCG qual serum, CT-head  to evaluate her symptoms. The differential diagnoses include but are not limited to: subarachnoid hemorrhage, tension headache, migraine headache    Decision Making:  Patient with acute onset of headache, after vomiting, likely from cervical strain however the patient states that the worst headache of her life, does not feel like her typical migraine headache, she states that it was a thunderclap headache with active exertional vomiting.  We did CT scan within a 6-hour window, this was negative.  The patient is feeling improved after pain medicines.  The patient is up ambulating, states her neck does hurt, I believe it is likely a cervical strain and tension headache.  I will give the patient a prescription for Flexeril, Zofran, have the patient return with worsening symptoms.     The patient will return for new or worsening symptoms and is stable at the time of discharge.    The patient is referred to a primary physician for blood pressure management, diabetic screening, and for all other preventative health concerns.        DISPOSITION:  Patient will be discharged home in stable condition.    FOLLOW UP:  [facility], Emergency Dept  [facility] [facility] [address] [phone]    If symptoms worsen    Emmanuel Edmond M.D.  [address]  [facility] [address]  [phone]            OUTPATIENT MEDICATIONS:  New Prescriptions    CYCLOBENZAPRINE (FLEXERIL) 5 MG TABLET    Take 1-2 Tabs by mouth 3 times a day as needed.    ONDANSETRON (ZOFRAN ODT) 4 MG TABLET DISPERSIBLE    Take 1 Tab by mouth every 8 hours as needed.         FINAL IMPRESSION  1. Non-intractable vomiting with nausea, unspecified vomiting type    2. Tension headache          ILucero (Scribe), am scribing for, and in the presence of, Joshua Purvis M.D..    Electronically signed by: Lucero Navarro (Scribe), 7/31/2019    IJoshua M.D. personally performed the services described in this documentation, as scribed by Lucero Navarro in my presence, and it is both accurate and complete.    C    The note accurately reflects work and decisions made by me.  Joshua Purvis  7/31/2019  11:43 PM

## 2019-08-01 NOTE — ED NOTES
Patient medicated per orders for headache 8/10. Lying in L lateral position on gurney with knees to chest. Rew over head - complaining of severe photosensitivity. Placed on oxygen 2L NC per orders after receiving pain medication. Bed in low locked position with siderail up x 1. Call bell within reach. Family remains at bedside. Patient and family updated regarding plan of car e- awaiting CT scan.

## 2019-08-01 NOTE — ED TRIAGE NOTES
"Erna Garcia    Chief Complaint   Patient presents with   • Headache   • N/V     Pt BIBA above complaints starting approx 1 hour ago. +photosensitvity. Pt given zofran 4 mg PTA with relief.   PMH Migraines.      Blood Pressure: 106/76, Pulse: 62, Respiration: 18, Temperature: 36.7 °C (98.1 °F), Height: 172.7 cm (5' 8\"), Weight: 78 kg (171 lb 15.3 oz), BMI (Calculated): 26.15, BSA (Calculated): 1.9, Pulse Oximetry: 99 %    "

## 2019-10-17 ENCOUNTER — APPOINTMENT (OUTPATIENT)
Dept: RADIOLOGY | Facility: MEDICAL CENTER | Age: 44
End: 2019-10-17
Attending: EMERGENCY MEDICINE
Payer: COMMERCIAL

## 2019-10-17 ENCOUNTER — HOSPITAL ENCOUNTER (OUTPATIENT)
Facility: MEDICAL CENTER | Age: 44
End: 2019-10-19
Attending: EMERGENCY MEDICINE | Admitting: INTERNAL MEDICINE
Payer: COMMERCIAL

## 2019-10-17 DIAGNOSIS — R55 SYNCOPE, UNSPECIFIED SYNCOPE TYPE: ICD-10-CM

## 2019-10-17 DIAGNOSIS — S93.601A SPRAIN OF RIGHT FOOT, INITIAL ENCOUNTER: ICD-10-CM

## 2019-10-17 LAB
ANION GAP SERPL CALC-SCNC: 6 MMOL/L (ref 0–11.9)
BASOPHILS # BLD AUTO: 0.7 % (ref 0–1.8)
BASOPHILS # BLD: 0.08 K/UL (ref 0–0.12)
BUN SERPL-MCNC: 17 MG/DL (ref 8–22)
CALCIUM SERPL-MCNC: 8.6 MG/DL (ref 8.5–10.5)
CHLORIDE SERPL-SCNC: 104 MMOL/L (ref 96–112)
CO2 SERPL-SCNC: 27 MMOL/L (ref 20–33)
CREAT SERPL-MCNC: 0.81 MG/DL (ref 0.5–1.4)
EKG IMPRESSION: NORMAL
EOSINOPHIL # BLD AUTO: 0.46 K/UL (ref 0–0.51)
EOSINOPHIL NFR BLD: 3.9 % (ref 0–6.9)
ERYTHROCYTE [DISTWIDTH] IN BLOOD BY AUTOMATED COUNT: 46.5 FL (ref 35.9–50)
ETHANOL BLD-MCNC: 0 G/DL
GLUCOSE SERPL-MCNC: 91 MG/DL (ref 65–99)
HCT VFR BLD AUTO: 40 % (ref 37–47)
HGB BLD-MCNC: 13.5 G/DL (ref 12–16)
IMM GRANULOCYTES # BLD AUTO: 0.04 K/UL (ref 0–0.11)
IMM GRANULOCYTES NFR BLD AUTO: 0.3 % (ref 0–0.9)
LYMPHOCYTES # BLD AUTO: 2.31 K/UL (ref 1–4.8)
LYMPHOCYTES NFR BLD: 19.4 % (ref 22–41)
MCH RBC QN AUTO: 32.1 PG (ref 27–33)
MCHC RBC AUTO-ENTMCNC: 33.8 G/DL (ref 33.6–35)
MCV RBC AUTO: 95 FL (ref 81.4–97.8)
MONOCYTES # BLD AUTO: 0.89 K/UL (ref 0–0.85)
MONOCYTES NFR BLD AUTO: 7.5 % (ref 0–13.4)
NEUTROPHILS # BLD AUTO: 8.15 K/UL (ref 2–7.15)
NEUTROPHILS NFR BLD: 68.2 % (ref 44–72)
NRBC # BLD AUTO: 0 K/UL
NRBC BLD-RTO: 0 /100 WBC
PLATELET # BLD AUTO: 337 K/UL (ref 164–446)
PMV BLD AUTO: 8.5 FL (ref 9–12.9)
POTASSIUM SERPL-SCNC: 3.8 MMOL/L (ref 3.6–5.5)
RBC # BLD AUTO: 4.21 M/UL (ref 4.2–5.4)
SODIUM SERPL-SCNC: 137 MMOL/L (ref 135–145)
TROPONIN T SERPL-MCNC: 12 NG/L (ref 6–19)
WBC # BLD AUTO: 11.9 K/UL (ref 4.8–10.8)

## 2019-10-17 PROCEDURE — 80048 BASIC METABOLIC PNL TOTAL CA: CPT

## 2019-10-17 PROCEDURE — 99407 BEHAV CHNG SMOKING > 10 MIN: CPT | Performed by: INTERNAL MEDICINE

## 2019-10-17 PROCEDURE — G0378 HOSPITAL OBSERVATION PER HR: HCPCS

## 2019-10-17 PROCEDURE — 700105 HCHG RX REV CODE 258: Performed by: INTERNAL MEDICINE

## 2019-10-17 PROCEDURE — 85025 COMPLETE CBC W/AUTO DIFF WBC: CPT

## 2019-10-17 PROCEDURE — 700102 HCHG RX REV CODE 250 W/ 637 OVERRIDE(OP): Performed by: EMERGENCY MEDICINE

## 2019-10-17 PROCEDURE — 73630 X-RAY EXAM OF FOOT: CPT | Mod: RT

## 2019-10-17 PROCEDURE — 93005 ELECTROCARDIOGRAM TRACING: CPT | Performed by: EMERGENCY MEDICINE

## 2019-10-17 PROCEDURE — 99285 EMERGENCY DEPT VISIT HI MDM: CPT

## 2019-10-17 PROCEDURE — 80307 DRUG TEST PRSMV CHEM ANLYZR: CPT

## 2019-10-17 PROCEDURE — 84484 ASSAY OF TROPONIN QUANT: CPT

## 2019-10-17 PROCEDURE — A9270 NON-COVERED ITEM OR SERVICE: HCPCS | Performed by: EMERGENCY MEDICINE

## 2019-10-17 PROCEDURE — 99220 PR INITIAL OBSERVATION CARE,LEVL III: CPT | Mod: 25 | Performed by: INTERNAL MEDICINE

## 2019-10-17 RX ORDER — IBUPROFEN 600 MG/1
600 TABLET ORAL ONCE
Status: COMPLETED | OUTPATIENT
Start: 2019-10-17 | End: 2019-10-17

## 2019-10-17 RX ORDER — BISACODYL 10 MG
10 SUPPOSITORY, RECTAL RECTAL
Status: DISCONTINUED | OUTPATIENT
Start: 2019-10-17 | End: 2019-10-19 | Stop reason: HOSPADM

## 2019-10-17 RX ORDER — PROMETHAZINE HYDROCHLORIDE 25 MG/1
12.5-25 SUPPOSITORY RECTAL EVERY 4 HOURS PRN
Status: DISCONTINUED | OUTPATIENT
Start: 2019-10-17 | End: 2019-10-17

## 2019-10-17 RX ORDER — ENALAPRILAT 1.25 MG/ML
1.25 INJECTION INTRAVENOUS EVERY 6 HOURS PRN
Status: DISCONTINUED | OUTPATIENT
Start: 2019-10-17 | End: 2019-10-19 | Stop reason: HOSPADM

## 2019-10-17 RX ORDER — SODIUM CHLORIDE 9 MG/ML
INJECTION, SOLUTION INTRAVENOUS CONTINUOUS
Status: DISCONTINUED | OUTPATIENT
Start: 2019-10-17 | End: 2019-10-18

## 2019-10-17 RX ORDER — CHOLECALCIFEROL (VITAMIN D3) 125 MCG
500 CAPSULE ORAL DAILY
Status: DISCONTINUED | OUTPATIENT
Start: 2019-10-18 | End: 2019-10-19 | Stop reason: HOSPADM

## 2019-10-17 RX ORDER — PROMETHAZINE HYDROCHLORIDE 25 MG/1
12.5-25 TABLET ORAL EVERY 4 HOURS PRN
Status: DISCONTINUED | OUTPATIENT
Start: 2019-10-17 | End: 2019-10-17

## 2019-10-17 RX ORDER — ACETAMINOPHEN 325 MG/1
650 TABLET ORAL EVERY 6 HOURS PRN
Status: DISCONTINUED | OUTPATIENT
Start: 2019-10-17 | End: 2019-10-19 | Stop reason: HOSPADM

## 2019-10-17 RX ORDER — IBUPROFEN 200 MG
400-800 TABLET ORAL EVERY 8 HOURS PRN
Status: DISCONTINUED | OUTPATIENT
Start: 2019-10-17 | End: 2019-10-18

## 2019-10-17 RX ORDER — PROPRANOLOL HYDROCHLORIDE 10 MG/1
10 TABLET ORAL PRN
COMMUNITY
End: 2023-04-07 | Stop reason: SDUPTHER

## 2019-10-17 RX ORDER — POLYETHYLENE GLYCOL 3350 17 G/17G
1 POWDER, FOR SOLUTION ORAL
Status: DISCONTINUED | OUTPATIENT
Start: 2019-10-17 | End: 2019-10-19 | Stop reason: HOSPADM

## 2019-10-17 RX ORDER — ONDANSETRON 4 MG/1
4 TABLET, ORALLY DISINTEGRATING ORAL EVERY 4 HOURS PRN
Status: DISCONTINUED | OUTPATIENT
Start: 2019-10-17 | End: 2019-10-19 | Stop reason: HOSPADM

## 2019-10-17 RX ORDER — ONDANSETRON 2 MG/ML
4 INJECTION INTRAMUSCULAR; INTRAVENOUS EVERY 4 HOURS PRN
Status: DISCONTINUED | OUTPATIENT
Start: 2019-10-17 | End: 2019-10-19 | Stop reason: HOSPADM

## 2019-10-17 RX ORDER — AMOXICILLIN 250 MG
2 CAPSULE ORAL 2 TIMES DAILY
Status: DISCONTINUED | OUTPATIENT
Start: 2019-10-17 | End: 2019-10-19 | Stop reason: HOSPADM

## 2019-10-17 RX ADMIN — IBUPROFEN 600 MG: 600 TABLET ORAL at 20:26

## 2019-10-17 RX ADMIN — SODIUM CHLORIDE: 9 INJECTION, SOLUTION INTRAVENOUS at 22:13

## 2019-10-17 ASSESSMENT — COGNITIVE AND FUNCTIONAL STATUS - GENERAL
CLIMB 3 TO 5 STEPS WITH RAILING: A LOT
SUGGESTED CMS G CODE MODIFIER MOBILITY: CK
DAILY ACTIVITIY SCORE: 24
MOBILITY SCORE: 18
WALKING IN HOSPITAL ROOM: A LOT
STANDING UP FROM CHAIR USING ARMS: A LOT
SUGGESTED CMS G CODE MODIFIER DAILY ACTIVITY: CH

## 2019-10-17 ASSESSMENT — ENCOUNTER SYMPTOMS
CHILLS: 0
TINGLING: 0
SPUTUM PRODUCTION: 0
LOSS OF CONSCIOUSNESS: 0
DIZZINESS: 0
ABDOMINAL PAIN: 0
MEMORY LOSS: 1
FEVER: 0
FALLS: 0
MYALGIAS: 0
COUGH: 0
WEAKNESS: 0
DEPRESSION: 0
CONSTIPATION: 0
PALPITATIONS: 0
VOMITING: 0
BACK PAIN: 1
HEADACHES: 0
NAUSEA: 0
SHORTNESS OF BREATH: 0
STRIDOR: 0
DIARRHEA: 0

## 2019-10-17 ASSESSMENT — LIFESTYLE VARIABLES
EVER HAD A DRINK FIRST THING IN THE MORNING TO STEADY YOUR NERVES TO GET RID OF A HANGOVER: NO
EVER FELT BAD OR GUILTY ABOUT YOUR DRINKING: NO
AVERAGE NUMBER OF DAYS PER WEEK YOU HAVE A DRINK CONTAINING ALCOHOL: 3
CONSUMPTION TOTAL: POSITIVE
HOW MANY TIMES IN THE PAST YEAR HAVE YOU HAD 5 OR MORE DRINKS IN A DAY: 2
ON A TYPICAL DAY WHEN YOU DRINK ALCOHOL HOW MANY DRINKS DO YOU HAVE: 2
ALCOHOL_USE: YES
TOTAL SCORE: 0
TOTAL SCORE: 0
HAVE YOU EVER FELT YOU SHOULD CUT DOWN ON YOUR DRINKING: NO
HAVE PEOPLE ANNOYED YOU BY CRITICIZING YOUR DRINKING: NO
DO YOU DRINK ALCOHOL: NO
DOES PATIENT WANT TO STOP DRINKING: NO
EVER_SMOKED: YES
TOTAL SCORE: 0

## 2019-10-17 ASSESSMENT — PATIENT HEALTH QUESTIONNAIRE - PHQ9
2. FEELING DOWN, DEPRESSED, IRRITABLE, OR HOPELESS: NOT AT ALL
SUM OF ALL RESPONSES TO PHQ9 QUESTIONS 1 AND 2: 0
1. LITTLE INTEREST OR PLEASURE IN DOING THINGS: NOT AT ALL

## 2019-10-18 ENCOUNTER — PATIENT OUTREACH (OUTPATIENT)
Dept: HEALTH INFORMATION MANAGEMENT | Facility: OTHER | Age: 44
End: 2019-10-18

## 2019-10-18 PROBLEM — G93.41 ACUTE METABOLIC ENCEPHALOPATHY: Status: ACTIVE | Noted: 2019-10-18

## 2019-10-18 PROBLEM — M25.571 RIGHT ANKLE PAIN: Status: ACTIVE | Noted: 2019-10-18

## 2019-10-18 PROBLEM — D72.829 LEUKOCYTOSIS: Status: ACTIVE | Noted: 2019-10-18

## 2019-10-18 PROBLEM — R55 SYNCOPE: Status: ACTIVE | Noted: 2019-10-18

## 2019-10-18 LAB
ANION GAP SERPL CALC-SCNC: 8 MMOL/L (ref 0–11.9)
BUN SERPL-MCNC: 12 MG/DL (ref 8–22)
CALCIUM SERPL-MCNC: 8.1 MG/DL (ref 8.5–10.5)
CHLORIDE SERPL-SCNC: 107 MMOL/L (ref 96–112)
CO2 SERPL-SCNC: 24 MMOL/L (ref 20–33)
CREAT SERPL-MCNC: 0.76 MG/DL (ref 0.5–1.4)
ERYTHROCYTE [DISTWIDTH] IN BLOOD BY AUTOMATED COUNT: 48 FL (ref 35.9–50)
GLUCOSE SERPL-MCNC: 86 MG/DL (ref 65–99)
HCT VFR BLD AUTO: 42 % (ref 37–47)
HGB BLD-MCNC: 13.2 G/DL (ref 12–16)
MCH RBC QN AUTO: 30.3 PG (ref 27–33)
MCHC RBC AUTO-ENTMCNC: 31.4 G/DL (ref 33.6–35)
MCV RBC AUTO: 96.6 FL (ref 81.4–97.8)
PLATELET # BLD AUTO: 343 K/UL (ref 164–446)
PMV BLD AUTO: 8.3 FL (ref 9–12.9)
POTASSIUM SERPL-SCNC: 4 MMOL/L (ref 3.6–5.5)
RBC # BLD AUTO: 4.35 M/UL (ref 4.2–5.4)
SODIUM SERPL-SCNC: 139 MMOL/L (ref 135–145)
WBC # BLD AUTO: 8.5 K/UL (ref 4.8–10.8)

## 2019-10-18 PROCEDURE — 29515 APPLICATION SHORT LEG SPLINT: CPT

## 2019-10-18 PROCEDURE — 700111 HCHG RX REV CODE 636 W/ 250 OVERRIDE (IP): Performed by: INTERNAL MEDICINE

## 2019-10-18 PROCEDURE — 303553 HCHG ELASTIC BANDAGE 4IN

## 2019-10-18 PROCEDURE — 85027 COMPLETE CBC AUTOMATED: CPT

## 2019-10-18 PROCEDURE — 36415 COLL VENOUS BLD VENIPUNCTURE: CPT

## 2019-10-18 PROCEDURE — 99225 PR SUBSEQUENT OBSERVATION CARE,LEVEL II: CPT | Performed by: HOSPITALIST

## 2019-10-18 PROCEDURE — G0378 HOSPITAL OBSERVATION PER HR: HCPCS

## 2019-10-18 PROCEDURE — 96374 THER/PROPH/DIAG INJ IV PUSH: CPT

## 2019-10-18 PROCEDURE — 700105 HCHG RX REV CODE 258: Performed by: INTERNAL MEDICINE

## 2019-10-18 PROCEDURE — 80048 BASIC METABOLIC PNL TOTAL CA: CPT

## 2019-10-18 PROCEDURE — A9270 NON-COVERED ITEM OR SERVICE: HCPCS | Performed by: INTERNAL MEDICINE

## 2019-10-18 PROCEDURE — 700102 HCHG RX REV CODE 250 W/ 637 OVERRIDE(OP): Performed by: INTERNAL MEDICINE

## 2019-10-18 RX ORDER — KETOROLAC TROMETHAMINE 30 MG/ML
30 INJECTION, SOLUTION INTRAMUSCULAR; INTRAVENOUS EVERY 6 HOURS PRN
Status: DISCONTINUED | OUTPATIENT
Start: 2019-10-18 | End: 2019-10-19 | Stop reason: HOSPADM

## 2019-10-18 RX ADMIN — ACETAMINOPHEN 650 MG: 325 TABLET, FILM COATED ORAL at 17:32

## 2019-10-18 RX ADMIN — ACETAMINOPHEN 650 MG: 325 TABLET, FILM COATED ORAL at 04:33

## 2019-10-18 RX ADMIN — CYANOCOBALAMIN TAB 500 MCG 500 MCG: 500 TAB at 04:33

## 2019-10-18 RX ADMIN — SODIUM CHLORIDE: 9 INJECTION, SOLUTION INTRAVENOUS at 11:02

## 2019-10-18 RX ADMIN — ONDANSETRON 4 MG: 2 INJECTION INTRAMUSCULAR; INTRAVENOUS at 23:59

## 2019-10-18 SDOH — ECONOMIC STABILITY: INCOME INSECURITY: HOW HARD IS IT FOR YOU TO PAY FOR THE VERY BASICS LIKE FOOD, HOUSING, MEDICAL CARE, AND HEATING?: NOT VERY HARD

## 2019-10-18 SDOH — ECONOMIC STABILITY: TRANSPORTATION INSECURITY
IN THE PAST 12 MONTHS, HAS THE LACK OF TRANSPORTATION KEPT YOU FROM MEDICAL APPOINTMENTS OR FROM GETTING MEDICATIONS?: NO

## 2019-10-18 SDOH — ECONOMIC STABILITY: FOOD INSECURITY: WITHIN THE PAST 12 MONTHS, YOU WORRIED THAT YOUR FOOD WOULD RUN OUT BEFORE YOU GOT MONEY TO BUY MORE.: NEVER TRUE

## 2019-10-18 SDOH — ECONOMIC STABILITY: FOOD INSECURITY: WITHIN THE PAST 12 MONTHS, THE FOOD YOU BOUGHT JUST DIDN'T LAST AND YOU DIDN'T HAVE MONEY TO GET MORE.: NEVER TRUE

## 2019-10-18 SDOH — ECONOMIC STABILITY: TRANSPORTATION INSECURITY
IN THE PAST 12 MONTHS, HAS LACK OF TRANSPORTATION KEPT YOU FROM MEETINGS, WORK, OR FROM GETTING THINGS NEEDED FOR DAILY LIVING?: NO

## 2019-10-18 ASSESSMENT — ENCOUNTER SYMPTOMS
BRUISES/BLEEDS EASILY: 0
PHOTOPHOBIA: 0
FLANK PAIN: 0
NAUSEA: 0
VOMITING: 0
DIZZINESS: 1
DIARRHEA: 0
SINUS PAIN: 0
SORE THROAT: 0
CHILLS: 0
HEADACHES: 0
COUGH: 0
EYE PAIN: 0
DIAPHORESIS: 0
CLAUDICATION: 0
POLYDIPSIA: 0
NECK PAIN: 0
PND: 0
WHEEZING: 0
TINGLING: 0
SHORTNESS OF BREATH: 0
FEVER: 0
WEAKNESS: 0
TREMORS: 0
ORTHOPNEA: 0
DEPRESSION: 0
MYALGIAS: 0
CONSTIPATION: 0
SPUTUM PRODUCTION: 0
HALLUCINATIONS: 0
PALPITATIONS: 0
ABDOMINAL PAIN: 0
HEARTBURN: 0
HEMOPTYSIS: 0
DOUBLE VISION: 0
BLURRED VISION: 0
BLOOD IN STOOL: 0
BACK PAIN: 0
STRIDOR: 0
FALLS: 1

## 2019-10-18 ASSESSMENT — LIFESTYLE VARIABLES: SUBSTANCE_ABUSE: 0

## 2019-10-18 NOTE — DIETARY
Nutrition Services:    MST 2 on Nutrition Admit Screen.   Pt is currently on a Regular diet. PO intake not documented. Spoke w pt at bedside; pt reports eating 50-75% of meals. Appetite improving per pt.     Wt loss indicated by nutr screen not accurate per pt report of -170 (, no noted edema). Ht: 162.2 cm, Wt: 82.9 kg, BMI 31.37.  Consult RD as needed. RD will re-screen weekly.      RD available prn

## 2019-10-18 NOTE — ASSESSMENT & PLAN NOTE
-Likely reactive, no additional sign of infection  -Repeat CBC in the morning  -No antibiotics at this time

## 2019-10-18 NOTE — ASSESSMENT & PLAN NOTE
-Possible, patient is unsure  -No witnesses  -Monitor patient on telemetry  -If she did pass out, I feel this likely due to medication and/or additional substance use  -I do not feel she needs an echocardiogram at this time

## 2019-10-18 NOTE — ED PROVIDER NOTES
ED Provider Note    HPI: Patient is a 44-year-old female who presented to the emergency department by ambulance transport October 17, 2019 at 6:50 PM with a chief complaint of right foot pain.    Paramedics were called after the patient became unresponsive at work.  The patient has a history of chronic low back pain for which she takes oxycodone and thinks he may have taken too much.  Paramedics gave the patient Narcan and she immediately woke up.  The patient now notes back pain as well as pain in the right foot.  No chest pain no shortness of breath no abdominal pain no fever no chills no cough.  No change in bladder bowel habits.  No other somatic complaints    Review of Systems: Positive for right foot pain chronic low back pain negative chest pain shortness of breath fever chills cough change in bladder bowel habits.  Review of systems reviewed with patient, all other systems not    Past medical/surgical history: Chronic low back pain/sciatica anxiety headaches    Medications: Flexeril Zofran oxycodone Compazine    Allergies: Aspirin Phenergan but she takes Motrin for chronic migraine    Social History: Patient smokes 1 pack of cigarettes per day history of methamphetamine use social use of a      Physical exam: Constitutional: Well-developed well-nourished female awake alert appears somewhat anxious  Vital signs: Temperature 97.3 pulse 97 respirations 20 blood pressure 138/68 pulse oximetry 94 per  EYES: PERRL, EOMI, Conjunctivae and sclera normal, eyelids normal bilaterally.  Neck: Trachea midline. No cervical masses seen or palpated. Normal range of motion, supple. No meningeal signs elicited.  Cardiac: Regular rate and rhythm. S1-S2 present. No S3 or S4 present. No murmurs, rubs, or gallops heard. No edema or varicosities were seen.   Lungs: Clear to auscultation with good aeration throughout. No wheezes, rales, or rhonchi heard. Patient's chest wall moved symmetrically with each respiratory effort. Patient  was not making use of accessory muscles of respiration in breathing.  Abdomen: Soft nontender to palpation. No rebound or guarding elicited. No organomegaly identified. No pulsatile abdominal masses identified.   Musculoskeletal: Pain across the dorsal aspect of the right foot.  I sent no obvious evidence of soft tissue swelling.  This area is more painful with palpation however.  I could elicit no pain with palpation of the calcaneus or ankle region.  No other pain with palpation or movement of muscle bone or joint.  No other musculoskeletal deformities identified.  Neurologic: alert and awake answers questions appropriately. Moves all four extremities independently, no gross focal abnormalities identified. Normal strength and motor.  Skin: no rash or lesion seen, no palpable dermatologic lesions identified.  Psychiatric: Patient appeared to be anxious.  Somewhat slow to respond.  She was not delusional or hallucinating    Medical decision making: I suspect the patient's syncopal episode was secondary to her accidentally taken too much oxycodone especially given the rapid reversal of symptoms after being given a reversal agent by the paramedics.      Drug database reviewed.  There is no evidence of inappropriate utilization    X-ray right foot obtained; no acute abnormalities were seen.  Patient placed in an OCL.    Recheck showed the patient actually somewhat more somnolent.  Laboratory studies obtained (please see lab sheet for all results) significant findings included mild leukocytosis 11.9.  Blood alcohol was 0 CHEM panel was normal    EKG obtained (interpretation) lead EKG sinus rhythm rate 79.  Morphology P waves QRS complexes T waves unremarkable.  No evidence of ST elevation or depression.  R wave progression normal.  Interpreted as an unremarkable EKG for acute findings    Patient admitted for observation by hospitalist service.  She appears to have had a syncopal episode.  The cause of this is not clear  at this time.  The patient said she had only taken a very small amount of oxycodone but she did have a market improvement in mental status with administration of Narcan.    Further care and hospital course per attending physician summary    Impression syncope  2) right foot sprain

## 2019-10-18 NOTE — DISCHARGE PLANNING
Patient is eligible for Medicaid Meds to Beds at discharge if they have coverage with [place] Medicaid, Medicaid FFS, Medicaid HMO ([facility]), or [place]. This service is provided through the [facility] if orders are received by the pharmacy prior to 4pm Monday through Friday. Preferred pharmacy has been changed to [facility]. Please call [phone] prior to discharge.

## 2019-10-18 NOTE — PROGRESS NOTES
Report received from Aj CHU. Pt care assumed. Assessment performed. Pt AOx4 but still somnolent. Pt laying supine in bed. Pt complains of pain 7/10 in her ankle; ice applied. Bed in low, locked position. Pt educated on calling to ambulate. Call light within reach. Treaded socks on pt.  Hourly rounding in place. Pt placed on tele box. Monitor room notified; SR. Pt O2 sat at 97% on RA. Pt blood pressure 98/65 and said that she runs low so that is normal for her.

## 2019-10-18 NOTE — ED TRIAGE NOTES
Pt arrived via EMS from her work tonight where she had a syncope. Pt is c/o of R foot pain and has a small contusion on her occipital. EMS stated she was unresponsive and given Narcan and was easily revised. GCS of 15 AOx4

## 2019-10-18 NOTE — PROGRESS NOTES
"CHW Edison met with pt in hospital 10/18. Pt states that she has a PCP at [facility]. PCP last name starts with a \"K\" and does not need help with making an appt. Pt says she has a good support system with close family friends. She says her living situation is temporary. She is staying with her friend but only for two more weeks and then will need help with housing. Pt does state she is confident in managing own health and needs no referral.    Community Health Worker Intake  • Social determinates of health intake completed  • Identified barriers to housing  • Contact information provided to Erna  • PCP = @ [facility], no appt     Plan:   CHW will do f/u phone call with pt. CHW will help pt with housing resources. CHW needs to ask for the name of her PCP at [facility].      "

## 2019-10-18 NOTE — ASSESSMENT & PLAN NOTE
-Likely due to ingestion of narcotic, possibly associated with alcohol and/or marijuana  -She did have improvement with Narcan  -At this point, she is somnolent but is able to answer all questions however slowly, I do not feel she needs more Narcan at this time

## 2019-10-18 NOTE — CARE PLAN
Problem: Safety  Goal: Will remain free from falls  Outcome: PROGRESSING AS EXPECTED  Intervention: Implement fall precautions  Flowsheets (Taken 10/17/2019 2200)  Environmental Precautions: Treaded Slipper Socks on Patient;Personal Belongings, Wastebasket, Call Bell etc. in Easy Reach;Transferred to Stronger Side;Report Given to Other Health Care Providers Regarding Fall Risk;Bed in Low Position;Communication Sign for Patients & Families;Mobility Assessed & Appropriate Sign Placed     Problem: Pain Management  Goal: Pain level will decrease to patient's comfort goal  Outcome: PROGRESSING AS EXPECTED  Intervention: Educate and implement non-pharmacologic comfort measures. Examples: relaxation, distration, play therapy, activity therapy, massage, etc.  Flowsheets (Taken 10/17/2019 2226)  Intervention: Elevation; Cold Pack; Repositioned

## 2019-10-18 NOTE — H&P
"Hospital Medicine History & Physical Note    Date of Service  10/17/2019    Primary Care Physician  None     Consultants  None    Code Status  Full    Chief Complaint  Memory loss    History of Presenting Illness  44 y.o. female who presented 10/17/2019 with memory loss.  Patient at this point is somewhat somnolent, difficult to obtain information from her.  She states she woke up in her usual state of health.  She does have chronic back pain but had acute worsening today.  She thinks is because of the shoes she were to work.  She states she took less than a half of her oxycodone and then does not remember anything after that.  She states the next thing she knew she was waking up in an ambulance with right foot pain.  She states there was no witnesses that she knows of.  She was at work at the time but did not speak to anyone.  She was however told by EMS that she had apparently passed out.  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  She later stated she did not drink daily however.  She states she did not drink alcohol or use marijuana today.  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  She denies any vision changes.  EMS apparently gave her a dose of Narcan and her mentation improved.  I did discuss the case including labs and imaging with the ER physician.    Review of Systems  Review of Systems   Constitutional: Negative for chills, fever and malaise/fatigue.   HENT: Negative for congestion.    Respiratory: Negative for cough, sputum production, shortness of breath and stridor.    Cardiovascular: Negative for chest pain, palpitations and leg swelling.   Gastrointestinal: Negative for abdominal pain, constipation, diarrhea, nausea and vomiting.   Genitourinary: Negative for dysuria and urgency.   Musculoskeletal: Positive for back pain. Negative for falls and " myalgias.   Neurological: Negative for dizziness, tingling, loss of consciousness, weakness and headaches.   Psychiatric/Behavioral: Positive for memory loss. Negative for depression and suicidal ideas.   All other systems reviewed and are negative.      Past Medical History   has a past medical history of Anxiety, ASTHMA, Head ache, and Sciatica.    Surgical History   has a past surgical history that includes gyn surgery; other orthopedic surgery; hysterectomy laparoscopy; tubal ligation; and wrist orif.     Family History  family history includes Cancer in her maternal grandmother; Heart Disease in her maternal grandfather.     Social History   reports that she has been smoking cigarettes. She has been smoking about 1.00 pack per day. She has never used smokeless tobacco. She reports that she drinks alcohol. She reports that she has current or past drug history. Drug: Inhaled.    Allergies  Allergies   Allergen Reactions   • Aspirin    • Aspirin      Swelling    • Phenergan [Promethazine] Anxiety       Medications  Prior to Admission Medications   Prescriptions Last Dose Informant Patient Reported? Taking?   ascorbic acid (ASCORBIC ACID) 500 MG TABS   Yes No   Sig: Take 500 mg by mouth every day.   butalbital-acetaminophen-caffeine (FIORICET WITH CODEINE) -67-30 MG per capsule   No No   Sig: Take 1 Cap by mouth every four hours as needed for Headache.   cyanocobalamin (VITAMIN B-12) 500 MCG TABS   Yes No   Sig: Take 500 mcg by mouth every day.   cyclobenzaprine (FLEXERIL) 5 MG tablet   No No   Sig: Take 1-2 Tabs by mouth 3 times a day as needed.   hydrocodone-acetaminophen (NORCO) 5-325 MG TABS per tablet   No No   Sig: Take 1-2 Tabs by mouth every 6 hours as needed.   ibuprofen (MOTRIN) 800 MG Tab   No No   Sig: Take 0.5-1 Tabs by mouth every 8 hours as needed.   ondansetron (ZOFRAN ODT) 4 MG TABLET DISPERSIBLE   No No   Sig: Take 1 Tab by mouth every 8 hours as needed.   ondansetron (ZOFRAN ODT) 4 MG TBDP    No No   Sig: Take 1 Tab by mouth every 6 hours as needed for Nausea/Vomiting for 10 doses.   prochlorperazine (COMPAZINE) 10 MG Tab   No No   Sig: Take 1 Tab by mouth every 6 hours as needed.      Facility-Administered Medications: None       Physical Exam  Temp:  [36.3 °C (97.3 °F)] 36.3 °C (97.3 °F)  Pulse:  [97] 97  Resp:  [20] 20  SpO2:  [94 %] 94 %    Physical Exam   Constitutional: She is oriented to person, place, and time. She appears well-developed. She is cooperative. No distress.   HENT:   Head: Normocephalic and atraumatic. Not macrocephalic and not microcephalic. Head is without raccoon's eyes and without Green's sign.   Right Ear: External ear normal.   Left Ear: External ear normal.   Mouth/Throat: Mucous membranes are dry. No oropharyngeal exudate.   Eyes: Conjunctivae are normal. Right eye exhibits no discharge. Left eye exhibits no discharge. No scleral icterus.   Neck: Neck supple. No tracheal deviation present.   Cardiovascular: Normal rate, regular rhythm and intact distal pulses. Exam reveals no gallop, no distant heart sounds and no friction rub.   No murmur heard.  Pulmonary/Chest: Effort normal and breath sounds normal. No accessory muscle usage or stridor. No tachypnea and no bradypnea. No respiratory distress. She has no decreased breath sounds. She has no wheezes. She has no rhonchi. She has no rales. She exhibits no tenderness.   Abdominal: Soft. Bowel sounds are normal. She exhibits no distension. There is no hepatosplenomegaly, splenomegaly or hepatomegaly. There is no tenderness. There is no rebound and no guarding.   Musculoskeletal: Normal range of motion. She exhibits no edema or tenderness.   Lymphadenopathy:     She has no cervical adenopathy.   Neurological: She is alert and oriented to person, place, and time. No cranial nerve deficit. She displays no seizure activity.   Skin: Skin is warm, dry and intact. No rash noted. She is not diaphoretic. No erythema. No pallor.    Psychiatric: Her speech is delayed. She is slowed. Cognition and memory are impaired.   Nursing note and vitals reviewed.      Laboratory:  Recent Labs     10/17/19  2022   WBC 11.9*   RBC 4.21   HEMOGLOBIN 13.5   HEMATOCRIT 40.0   MCV 95.0   MCH 32.1   MCHC 33.8   RDW 46.5   PLATELETCT 337   MPV 8.5*     Recent Labs     10/17/19  2022   SODIUM 137   POTASSIUM 3.8   CHLORIDE 104   CO2 27   GLUCOSE 91   BUN 17   CREATININE 0.81   CALCIUM 8.6     Recent Labs     10/17/19  2022   GLUCOSE 91         No results for input(s): NTPROBNP in the last 72 hours.      Recent Labs     10/17/19  2022   TROPONINT 12       Urinalysis:    No results found     Imaging:  DX-FOOT-COMPLETE 3+ RIGHT   Final Result      1.  No acute findings.      2.  Mild hallux valgus deformity with degenerative change in the first MTP joint.            Assessment/Plan:  I anticipate this patient is appropriate for observation status at this time.    Acute metabolic encephalopathy- (present on admission)  Assessment & Plan  -Likely due to ingestion of narcotic, possibly associated with alcohol and/or marijuana  -She did have improvement with Narcan  -At this point, she is somnolent but is able to answer all questions however slowly, I do not feel she needs more Narcan at this time    Syncope- (present on admission)  Assessment & Plan  -Possible, patient is unsure  -No witnesses  -Monitor patient on telemetry  -If she did pass out, I feel this likely due to medication and/or additional substance use  -I do not feel she needs an echocardiogram at this time    Right ankle pain  Assessment & Plan  -I did personally review her foot x-ray, noted no acute bony change  -Avoid narcotics at this time    Leukocytosis- (present on admission)  Assessment & Plan  -Likely reactive, no additional sign of infection  -Repeat CBC in the morning  -No antibiotics at this time    Tobacco abuse- (present on admission)  Assessment & Plan  -Tobacco cessation counseling and  education provided for more than 10 minutes. Nicotine replacement options provided including patch, and further medical treatments including Wellbutrin and chantix.  As well as over the counter options of lozenges and gum.      VTE prophylaxis: SCDs

## 2019-10-18 NOTE — PROGRESS NOTES
[facility] Medicine Daily Progress Note    Date of Service  10/18/2019    Chief Complaint  44 y.o. female admitted 10/17/2019 with past medical history of anxiety, asthma, chronic back pain comes at the emergency room after having a syncopal attack and right heel pain.  Patient states that she took a pill which could have been possibly fentanyl.    Hospital Course    Patient was admitted to the hospital with normal vital signs.  On route to the hospital she was given Narcan which she responded to.  X-ray of the foot found no evidence of fractures or dislocation.  EKG found normal sinus rhythm with no ST segment changes.      Interval Problem Update  10/18: Patient was complaining about excessive amounts of pain on the right heel.  I have asked nurse to unwrap the bandage and replace it with a walking boot.  I prescribed her Toradol.  We will monitor for another 24 hours and anticipate discharge in a.m.    Consultants/Specialty  None    Code Status  Full    Disposition  Home    Review of Systems  Review of Systems   Constitutional: Negative for chills, diaphoresis, fever and malaise/fatigue.   HENT: Negative for congestion, ear discharge, ear pain, hearing loss, nosebleeds, sinus pain, sore throat and tinnitus.    Eyes: Negative for blurred vision, double vision, photophobia and pain.   Respiratory: Negative for cough, hemoptysis, sputum production, shortness of breath, wheezing and stridor.    Cardiovascular: Negative for chest pain, palpitations, orthopnea, claudication, leg swelling and PND.   Gastrointestinal: Negative for abdominal pain, blood in stool, constipation, diarrhea, heartburn, melena, nausea and vomiting.   Genitourinary: Negative for dysuria, flank pain, frequency, hematuria and urgency.   Musculoskeletal: Positive for falls and joint pain. Negative for back pain, myalgias and neck pain.   Skin: Negative for itching and rash.   Neurological: Positive for dizziness. Negative for tingling, tremors,  weakness and headaches.   Endo/Heme/Allergies: Negative for environmental allergies and polydipsia. Does not bruise/bleed easily.   Psychiatric/Behavioral: Negative for depression, hallucinations, substance abuse and suicidal ideas.        Physical Exam  Temp:  [36 °C (96.8 °F)-37.1 °C (98.7 °F)] 37.1 °C (98.7 °F)  Pulse:  [62-97] 82  Resp:  [13-20] 18  BP: ()/(55-79) 118/79  SpO2:  [93 %-99 %] 96 %    Physical Exam   Constitutional: She is oriented to person, place, and time. She appears well-developed and well-nourished.   HENT:   Head: Normocephalic and atraumatic.   Mouth/Throat: No oropharyngeal exudate.   Eyes: Pupils are equal, round, and reactive to light. EOM are normal. No scleral icterus.   Neck: Normal range of motion. Neck supple. No JVD present. No tracheal deviation present. No thyromegaly present.   Cardiovascular: Normal rate, regular rhythm and intact distal pulses. Exam reveals no gallop and no friction rub.   No murmur heard.  Pulmonary/Chest: Effort normal and breath sounds normal. No stridor. No respiratory distress. She has no wheezes. She has no rales. She exhibits no tenderness.   Abdominal: Soft. Bowel sounds are normal. She exhibits no distension and no mass. There is no tenderness. There is no rebound and no guarding.   Musculoskeletal: Normal range of motion. She exhibits no edema.   Bandage wrapped in the right leg with out any evidence of swelling or erythema   Lymphadenopathy:     She has no cervical adenopathy.   Neurological: She is alert and oriented to person, place, and time. She has normal reflexes. No cranial nerve deficit.   Skin: No rash noted. No erythema. No pallor.   Nursing note and vitals reviewed.      Fluids  No intake or output data in the 24 hours ending 10/18/19 1504    Laboratory  Recent Labs     10/17/19  2022 10/18/19  0455   WBC 11.9* 8.5   RBC 4.21 4.35   HEMOGLOBIN 13.5 13.2   HEMATOCRIT 40.0 42.0   MCV 95.0 96.6   MCH 32.1 30.3   MCHC 33.8 31.4*   RDW  46.5 48.0   PLATELETCT 337 343   MPV 8.5* 8.3*     Recent Labs     10/17/19  2022 10/18/19  0455   SODIUM 137 139   POTASSIUM 3.8 4.0   CHLORIDE 104 107   CO2 27 24   GLUCOSE 91 86   BUN 17 12   CREATININE 0.81 0.76   CALCIUM 8.6 8.1*                   Imaging  DX-FOOT-COMPLETE 3+ RIGHT   Final Result      1.  No acute findings.      2.  Mild hallux valgus deformity with degenerative change in the first MTP joint.           Assessment/Plan  Acute metabolic encephalopathy- (present on admission)  Assessment & Plan  -Likely due to ingestion of narcotic, possibly associated with alcohol and/or marijuana  -She did have improvement with Narcan  -At this point, she is somnolent but is able to answer all questions however slowly, I do not feel she needs more Narcan at this time    Syncope- (present on admission)  Assessment & Plan  -Possible, patient is unsure  -No witnesses  -Monitor patient on telemetry  -If she did pass out, I feel this likely due to medication and/or additional substance use  -I do not feel she needs an echocardiogram at this time    Right ankle pain  Assessment & Plan  -I did personally review her foot x-ray, noted no acute bony change  -Avoid narcotics at this time    Leukocytosis- (present on admission)  Assessment & Plan  -Likely reactive, no additional sign of infection  -Repeat CBC in the morning  -No antibiotics at this time    Tobacco abuse- (present on admission)  Assessment & Plan  -Tobacco cessation counseling and education provided for more than 10 minutes. Nicotine replacement options provided including patch, and further medical treatments including Wellbutrin and chantix.  As well as over the counter options of lozenges and gum.       VTE prophylaxis: scd

## 2019-10-18 NOTE — ASSESSMENT & PLAN NOTE
-I did personally review her foot x-ray, noted no acute bony change  -Avoid narcotics at this time

## 2019-10-18 NOTE — PROGRESS NOTES
2 RN Skin Check    2 RN skin check complete with Radha CHU.   Devices in place: N/A  Skin assessed under devices: N\A.  Confirmed pressure ulcers found on: N/A.  New potential pressure ulcers noted on N/A. Wound consult placed N/A.  The following interventions in place Pillows.    Sacrum, heel and elbows intact and blanching.  General skin intact.  Small blister on right big toe.  Right ankle has slight swelling.

## 2019-10-19 VITALS
DIASTOLIC BLOOD PRESSURE: 49 MMHG | SYSTOLIC BLOOD PRESSURE: 103 MMHG | HEIGHT: 64 IN | TEMPERATURE: 97.7 F | OXYGEN SATURATION: 96 % | WEIGHT: 184.08 LBS | RESPIRATION RATE: 16 BRPM | HEART RATE: 76 BPM | BODY MASS INDEX: 31.43 KG/M2

## 2019-10-19 LAB
ALBUMIN SERPL BCP-MCNC: 3.2 G/DL (ref 3.2–4.9)
ALBUMIN/GLOB SERPL: 1.2 G/DL
ALP SERPL-CCNC: 58 U/L (ref 30–99)
ALT SERPL-CCNC: <5 U/L (ref 2–50)
ANION GAP SERPL CALC-SCNC: 4 MMOL/L (ref 0–11.9)
AST SERPL-CCNC: 17 U/L (ref 12–45)
BASOPHILS # BLD AUTO: 1 % (ref 0–1.8)
BASOPHILS # BLD: 0.09 K/UL (ref 0–0.12)
BILIRUB SERPL-MCNC: 0.3 MG/DL (ref 0.1–1.5)
BUN SERPL-MCNC: 9 MG/DL (ref 8–22)
CALCIUM SERPL-MCNC: 8.3 MG/DL (ref 8.5–10.5)
CHLORIDE SERPL-SCNC: 109 MMOL/L (ref 96–112)
CO2 SERPL-SCNC: 24 MMOL/L (ref 20–33)
CREAT SERPL-MCNC: 0.61 MG/DL (ref 0.5–1.4)
EOSINOPHIL # BLD AUTO: 0.47 K/UL (ref 0–0.51)
EOSINOPHIL NFR BLD: 5.2 % (ref 0–6.9)
ERYTHROCYTE [DISTWIDTH] IN BLOOD BY AUTOMATED COUNT: 48.5 FL (ref 35.9–50)
GLOBULIN SER CALC-MCNC: 2.7 G/DL (ref 1.9–3.5)
GLUCOSE SERPL-MCNC: 88 MG/DL (ref 65–99)
HCT VFR BLD AUTO: 43.9 % (ref 37–47)
HGB BLD-MCNC: 13.5 G/DL (ref 12–16)
IMM GRANULOCYTES # BLD AUTO: 0.03 K/UL (ref 0–0.11)
IMM GRANULOCYTES NFR BLD AUTO: 0.3 % (ref 0–0.9)
LYMPHOCYTES # BLD AUTO: 2.92 K/UL (ref 1–4.8)
LYMPHOCYTES NFR BLD: 32.5 % (ref 22–41)
MAGNESIUM SERPL-MCNC: 1.8 MG/DL (ref 1.5–2.5)
MCH RBC QN AUTO: 30.8 PG (ref 27–33)
MCHC RBC AUTO-ENTMCNC: 30.8 G/DL (ref 33.6–35)
MCV RBC AUTO: 100.2 FL (ref 81.4–97.8)
MONOCYTES # BLD AUTO: 0.61 K/UL (ref 0–0.85)
MONOCYTES NFR BLD AUTO: 6.8 % (ref 0–13.4)
NEUTROPHILS # BLD AUTO: 4.87 K/UL (ref 2–7.15)
NEUTROPHILS NFR BLD: 54.2 % (ref 44–72)
NRBC # BLD AUTO: 0 K/UL
NRBC BLD-RTO: 0 /100 WBC
PHOSPHATE SERPL-MCNC: 2.6 MG/DL (ref 2.5–4.5)
PLATELET # BLD AUTO: 304 K/UL (ref 164–446)
PMV BLD AUTO: 9.1 FL (ref 9–12.9)
POTASSIUM SERPL-SCNC: 4.1 MMOL/L (ref 3.6–5.5)
PROT SERPL-MCNC: 5.9 G/DL (ref 6–8.2)
RBC # BLD AUTO: 4.38 M/UL (ref 4.2–5.4)
SODIUM SERPL-SCNC: 137 MMOL/L (ref 135–145)
WBC # BLD AUTO: 9 K/UL (ref 4.8–10.8)

## 2019-10-19 PROCEDURE — A9270 NON-COVERED ITEM OR SERVICE: HCPCS | Performed by: HOSPITALIST

## 2019-10-19 PROCEDURE — A9270 NON-COVERED ITEM OR SERVICE: HCPCS | Performed by: INTERNAL MEDICINE

## 2019-10-19 PROCEDURE — 97166 OT EVAL MOD COMPLEX 45 MIN: CPT

## 2019-10-19 PROCEDURE — 99217 PR OBSERVATION CARE DISCHARGE: CPT | Performed by: HOSPITALIST

## 2019-10-19 PROCEDURE — G0378 HOSPITAL OBSERVATION PER HR: HCPCS

## 2019-10-19 PROCEDURE — 84100 ASSAY OF PHOSPHORUS: CPT

## 2019-10-19 PROCEDURE — 700111 HCHG RX REV CODE 636 W/ 250 OVERRIDE (IP): Performed by: HOSPITALIST

## 2019-10-19 PROCEDURE — 83735 ASSAY OF MAGNESIUM: CPT

## 2019-10-19 PROCEDURE — 36415 COLL VENOUS BLD VENIPUNCTURE: CPT

## 2019-10-19 PROCEDURE — 96375 TX/PRO/DX INJ NEW DRUG ADDON: CPT

## 2019-10-19 PROCEDURE — 85025 COMPLETE CBC W/AUTO DIFF WBC: CPT

## 2019-10-19 PROCEDURE — 700102 HCHG RX REV CODE 250 W/ 637 OVERRIDE(OP): Performed by: INTERNAL MEDICINE

## 2019-10-19 PROCEDURE — 80053 COMPREHEN METABOLIC PANEL: CPT

## 2019-10-19 PROCEDURE — 700102 HCHG RX REV CODE 250 W/ 637 OVERRIDE(OP): Performed by: HOSPITALIST

## 2019-10-19 PROCEDURE — 97161 PT EVAL LOW COMPLEX 20 MIN: CPT

## 2019-10-19 RX ORDER — METOCLOPRAMIDE HYDROCHLORIDE 5 MG/ML
10 INJECTION INTRAMUSCULAR; INTRAVENOUS EVERY 6 HOURS PRN
Status: DISCONTINUED | OUTPATIENT
Start: 2019-10-19 | End: 2019-10-19 | Stop reason: HOSPADM

## 2019-10-19 RX ORDER — BUTALBITAL, ACETAMINOPHEN AND CAFFEINE 50; 325; 40 MG/1; MG/1; MG/1
1 TABLET ORAL EVERY 6 HOURS PRN
Status: DISCONTINUED | OUTPATIENT
Start: 2019-10-19 | End: 2019-10-19 | Stop reason: HOSPADM

## 2019-10-19 RX ADMIN — KETOROLAC TROMETHAMINE 30 MG: 30 INJECTION, SOLUTION INTRAMUSCULAR at 01:40

## 2019-10-19 RX ADMIN — CYANOCOBALAMIN TAB 500 MCG 500 MCG: 500 TAB at 04:36

## 2019-10-19 RX ADMIN — BUTALBITAL, ACETAMINOPHEN, AND CAFFEINE 1 TABLET: 50; 325; 40 TABLET ORAL at 10:26

## 2019-10-19 ASSESSMENT — COGNITIVE AND FUNCTIONAL STATUS - GENERAL
SUGGESTED CMS G CODE MODIFIER DAILY ACTIVITY: CH
CLIMB 3 TO 5 STEPS WITH RAILING: A LITTLE
SUGGESTED CMS G CODE MODIFIER MOBILITY: CI
MOBILITY SCORE: 23
DAILY ACTIVITIY SCORE: 24

## 2019-10-19 ASSESSMENT — GAIT ASSESSMENTS
GAIT LEVEL OF ASSIST: SUPERVISED
DEVIATION: ANTALGIC
DISTANCE (FEET): 200

## 2019-10-19 ASSESSMENT — ACTIVITIES OF DAILY LIVING (ADL): TOILETING: INDEPENDENT

## 2019-10-19 NOTE — DISCHARGE INSTRUCTIONS
Foot Sprain  You have a sprained foot. When you twist your foot, the ligaments that hold the joints together are injured. This may cause pain, swelling, bruising, and difficulty walking. Proper treatment will shorten your disability and help you prevent re-injury. To treat a sprained foot you should:  · Elevate your foot for the next 2-3 days to reduce swelling.   · Apply ice packs to the foot for 20-30 minutes every 2-3 hours.   · Wrap your foot with a compression bandage as long as it is swollen or tender.   · Do not walk on your foot if it still hurts a lot.  Use crutches or a cane until weight bearing becomes painless.   · Special podiatric shoes or shoes with rigid soles may be useful in allowing earlier walking.   Only take over-the-counter or prescription medicines for pain, discomfort, or fever as directed by your caregiver. Most foot sprains will heal completely in 3-6 weeks with proper rest.  If you still have pain or swelling after 2-3 weeks, or if your pain worsens, you should see your doctor for further evaluation.  Document Released: 01/25/2006 Document Revised: 03/11/2013 Document Reviewed: 12/19/2009  [employer]® Patient Information ©2013 [employer].    Discharge Instructions    Discharged to home by car with friend. Discharged via wheelchair, hospital escort: Yes.  Special equipment needed: Not Applicable    Be sure to schedule a follow-up appointment with your primary care doctor or any specialists as instructed.     Discharge Plan:   Smoking Cessation Offered: Patient Refused  Influenza Vaccine Indication: Patient Refuses    I understand that a diet low in cholesterol, fat, and sodium is recommended for good health. Unless I have been given specific instructions below for another diet, I accept this instruction as my diet prescription.   Other diet: regular    Special Instructions:   Toxic Metabolic Encephalopathy  Toxic metabolic encephalopathy (TME) is a type of brain disorder caused by a  change in brain chemistry. This condition may result from illnesses or conditions that cause an imbalance of fluid, minerals (electrolytes), and other substances in the body that affect the way the brain functions. It is not caused by brain damage or brain disease.  TME can cause confusion and other mental disturbances, which are generally referred to as delirium. Untreated delirium may lead to permanent mental changes or worsening medical conditions. Untreated delirium is a life-threatening condition that may need to be treated in the hospital.  What are the causes?  Possible causes of TME that can lead to delirium include:  · Short-term (acute) or long-term (chronic) disease of the kidney or liver.  · Not having enough fluid in the body (dehydration).  · Changes in the acid level (pH) of the blood.  · High or low levels of any of the following substances in the blood:  ¨ Calcium.  ¨ Salt (sodium).  ¨ Sugar (glucose).  ¨ Magnesium.  ¨ Phosphate.  · High body temperature.  · Not having enough oxygen in the blood.  · Low levels of B vitamins. This can result from alcohol abuse.  · Certain medicines, such as steroids and medicines that reduce the activity of the immune system (immunosuppressants).  · Certain infections.  What increases the risk?  You may have a higher risk for TME if you:  · Are elderly.  · Have dementia.  · Are in the hospital, especially in intensive care.  · Live in a nursing home.  · Had recent surgery.  · Have liver or kidney disease.  · Have poorly controlled diabetes.  · Have chronic medical problems, especially heart or lung disease.  · Are not getting enough fluids.  · Have poor nutrition.  · Abuse alcohol.  What are the signs or symptoms?  Symptoms of TME may include:  · Muscle stiffness or jerking (spasticity).  · Shaking (tremors).  · Flapping of the hands.  · Weakness.  · Clumsiness.  · Slowed breathing.  · Jerky movements that you cannot control (seizures).  · Not being able to stay  awake (drowsiness).  · Not being able to pay attention.  · Loss of consciousness (coma).  Symptoms of delirium caused by TME include:  · Confusion.  · Difficulty focusing or concentrating, or inability to focus or concentrate.  · Not knowing where you are (disorientation).  · Seeing or hearing things that are not real (hallucinations).  · Fearfulness.  · False beliefs (delusions).  · Changes in mood or personality.  · Changes in speech, such as saying things that do not make sense.  · Memory loss.  · Irritability.  · Avoiding other people (withdrawal).  · Depression.  · Poor judgment.  · Changes in eating and sleeping patterns.  · Hyperactivity.  · Decreased alertness.  · General mistrust of others (paranoia).  Delirium may come and go. Symptoms of delirium may start suddenly or gradually, and they often get worse at night.  How is this diagnosed?  This condition is diagnosed based on:  · Your symptoms and behavior.  · An exam to check how you are thinking, feeling, and behaving (mental status exam). To diagnose delirium, the mental status exam must rule out other possible causes of TME, and must show:  ¨ Changes in attention and awareness.  ¨ Changes that develop over a short period of time and tend to come and go (fluctuate).  ¨ Changes in memory, language, and thinking that were not present before.  · A physical exam.  · Imaging tests, such as:  ¨ MRI.  ¨ CT scan.  · Blood tests to:  ¨ Measure liver and kidney function.  ¨ Check for a lack (deficiency) of vitamin B.  ¨ Check for changes in acid levels (pH) and changes in calcium, sodium, or magnesium levels in the blood.  ¨ Measure your blood sugar (glucose).  ¨ Measure your blood oxygen level.  How is this treated?  Treatment for TME depends on the cause, and it may include.  · Getting fluids through an IV tube.  · Regulating calcium, sodium, glucose, or magnesium levels in the body.  · Getting oxygen.  · Improving nutrition.  · Treating liver or kidney  disease.  · Adjusting certain medicines.  · Treating infections.  If the cause is found and treated, delirium usually improves. Managing delirium may include:  · Keeping the room well-lit and quiet.  · Using calendars, pictures, and clocks to prevent disorientation.  · Having frequent checks from nursing staff and visits from caregivers.  · Wearing eyeglasses or a hearing aid, if needed.  · Physical therapy.  · Medicine to treat agitation, anxiety, hallucinations, or delusions.  Follow these instructions at home:  · Drink enough fluid to keep your urine clear or pale yellow.  · Take over-the-counter and prescription medicines only as told by your health care provider.  · Return to your normal activities as told by your health care provider. Ask your health care provider what activities are safe for you.  · Follow a healthy diet. Do not skip meals.  · Do not drink alcohol.  · Go to bed at the same time every night.  · Keep all follow-up visits as told by your health care provider. This is important.  Contact a health care provider if:  · You are unable to feed yourself or hydrate yourself.  · You need help at home.  · You start to feel clumsy.  · You start to have tremors or weakness.  Get help right away if:  · You have a seizure.  · You lose consciousness.  · You have trouble breathing.  · You do not feel able to care for yourself at home.  · You have a fever.  · You become disoriented at home.  This information is not intended to replace advice given to you by your health care provider. Make sure you discuss any questions you have with your health care provider.  Document Released: 05/26/2017 Document Revised: 08/21/2017 Document Reviewed: 05/26/2017  [employer] Patient Education © 2017 [employer]    Syncope  Introduction  Syncope is when you lose temporarily pass out (faint). Signs that you may be about to pass out include:  · Feeling dizzy or light-headed.  · Feeling sick to your stomach  (nauseous).  · Seeing all white or all black.  · Having cold, clammy skin.  If you passed out, get help right away. Call your local emergency services (911 in the [place] Do not drive yourself to the hospital.  Follow these instructions at home:  Pay attention to any changes in your symptoms. Take these actions to help with your condition:  · Have someone stay with you until you feel stable.  · Do not drive, use machinery, or play sports until your doctor says it is okay.  · Keep all follow-up visits as told by your doctor. This is important.  · If you start to feel like you might pass out, lie down right away and raise (elevate) your feet above the level of your heart. Breathe deeply and steadily. Wait until all of the symptoms are gone.  · Drink enough fluid to keep your pee (urine) clear or pale yellow.  · If you are taking blood pressure or heart medicine, get up slowly and spend many minutes getting ready to sit and then stand. This can help with dizziness.  · Take over-the-counter and prescription medicines only as told by your doctor.  Get help right away if:  · You have a very bad headache.  · You have unusual pain in your chest, tummy, or back.  · You are bleeding from your mouth or rectum.  · You have black or tarry poop (stool).  · You have a very fast or uneven heartbeat (palpitations).  · It hurts to breathe.  · You pass out once or more than once.  · You have jerky movements that you cannot control (seizure).  · You are confused.  · You have trouble walking.  · You are very weak.  · You have vision problems.  These symptoms may be an emergency. Do not wait to see if the symptoms will go away. Get medical help right away. Call your local emergency services (911 in the [place] Do not drive yourself to the hospital.   This information is not intended to replace advice given to you by your health care provider. Make sure you discuss any questions you have with your health care provider.  Document Released:  06/05/2009 Document Revised: 05/25/2017 Document Reviewed: 08/31/2016  © 2017 [employer]      · Is patient discharged on Warfarin / Coumadin?   No     Depression / Suicide Risk    As you are discharged from this [facility] facility, it is important to learn how to keep safe from harming yourself.    Recognize the warning signs:  · Abrupt changes in personality, positive or negative- including increase in energy   · Giving away possessions  · Change in eating patterns- significant weight changes-  positive or negative  · Change in sleeping patterns- unable to sleep or sleeping all the time   · Unwillingness or inability to communicate  · Depression  · Unusual sadness, discouragement and loneliness  · Talk of wanting to die  · Neglect of personal appearance   · Rebelliousness- reckless behavior  · Withdrawal from people/activities they love  · Confusion- inability to concentrate     If you or a loved one observes any of these behaviors or has concerns about self-harm, here's what you can do:  · Talk about it- your feelings and reasons for harming yourself  · Remove any means that you might use to hurt yourself (examples: pills, rope, extension cords, firearm)  · Get professional help from the community (Mental Health, Substance Abuse, psychological counseling)  · Do not be alone:Call your Safe Contact- someone whom you trust who will be there for you.  · Call your local CRISIS HOTLINE 170-4580 or 542-940-3553  · Call your local Children's Mobile Crisis Response Team [place] (911) 431-8173 or www.RAZ Mobile  · Call the toll free National Suicide Prevention Hotlines   · National Suicide Prevention Lifeline 502-152-BVHH (2110)  · National Hope Line Network 800-SUICIDE (525-1696)

## 2019-10-19 NOTE — DISCHARGE SUMMARY
Discharge Summary    CHIEF COMPLAINT ON ADMISSION  Chief Complaint   Patient presents with   • Syncope   • Foot Pain       Reason for Admission  EMS     Admission Date  10/17/2019    CODE STATUS  Full Code    HPI & HOSPITAL COURSE  This is a 44 y.o. female here with with past medical history of anxiety, asthma, chronic back pain comes at the emergency room after having a syncopal attack and right heel pain.  Patient states that she took a pill which could have been possibly fentanyl.  Patient was admitted to the hospital with normal vital signs.  On route to the hospital she was given Narcan which she responded to.  X-ray of the foot found no evidence of fractures or dislocation.  EKG found normal sinus rhythm with no ST segment changes. Next day patient was found to be alert and oriented x3.  She is complaining that the bandage around her ankle was causing excessive amounts of pain.  This was removed and replaced with a walking boot.  Patient was asked to follow-up with her primary care doctor.    Therefore, she is discharged in good and stable condition to home with close outpatient follow-up.    observation    Discharge Date  10/19/2019    FOLLOW UP ITEMS POST DISCHARGE  Follow-up with primary care doctor    DISCHARGE DIAGNOSES  Active Problems:    Syncope POA: Yes    Acute metabolic encephalopathy POA: Yes    Tobacco abuse POA: Yes    Leukocytosis POA: Yes    Right ankle pain POA: Unknown  Resolved Problems:    * No resolved hospital problems. *      FOLLOW UP  No future appointments.  No follow-up provider specified.    MEDICATIONS ON DISCHARGE     Medication List      CONTINUE taking these medications      Instructions   propranolol 10 MG Tabs  Commonly known as:  INDERAL   Take 10 mg by mouth as needed (Tremors).  Dose:  10 mg            Allergies  Allergies   Allergen Reactions   • Aspirin      Swelling    • Phenergan [Promethazine] Anxiety       DIET  Orders Placed This Encounter   Procedures   • Diet Order  Regular (after passing bedside swallow)     Standing Status:   Standing     Number of Occurrences:   1     Order Specific Question:   Diet:     Answer:   Regular [1]     Comments:   after passing bedside swallow       ACTIVITY  As tolerated.  Weight bearing as tolerated    CONSULTATIONS  None    PROCEDURES  None    LABORATORY  Lab Results   Component Value Date    SODIUM 137 10/19/2019    POTASSIUM 4.1 10/19/2019    CHLORIDE 109 10/19/2019    CO2 24 10/19/2019    GLUCOSE 88 10/19/2019    BUN 9 10/19/2019    CREATININE 0.61 10/19/2019        Lab Results   Component Value Date    WBC 9.0 10/19/2019    HEMOGLOBIN 13.5 10/19/2019    HEMATOCRIT 43.9 10/19/2019    PLATELETCT 304 10/19/2019        Total time of the discharge process exceeds 40 minutes.

## 2019-10-19 NOTE — THERAPY
"43 y/o female adm for syncope with right foot pain, negative for fx given a Neil boot. She was able to willam boot safely. LLD due to boot . pt In st on proper footwear to offset LLD. Also advised of spc use , which she declined. No LOB on level ground ambulation with no Ad. Amb distance limited by migraine. No further acute PT services required at this time. Another visit may be rendered as per attending provider request for equipment or DC recommendations within 30 days of this evaluation.    Physical Therapy Evaluation completed.   Bed Mobility:  Supine to Sit: Independent  Transfers: Sit to Stand: Supervised  Gait: Level Of Assist: Supervised with No Equipment Needed       Plan of Care: Patient with no further skilled PT needs in the acute care setting at this time  Discharge Recommendations: Equipment: No Equipment Needed. Post-acute therapy Currently anticipate no further skilled therapy needs once patient is discharged from the inpatient setting.    See \"Rehab Therapy-Acute\" Patient Summary Report for complete documentation.     "

## 2019-10-19 NOTE — PROGRESS NOTES
"Report received from KIMBERLEY Menendez. Pt care assumed. Assessment performed. Pt AOx4. Pt laying supine in bed. Pt denies pain and no signs of distress. Bed in low, locked position. Pt educated on calling to ambulate. Call light within reach. Treaded socks on pt. Hourly rounding in place. When the CNA went in to do vitals, the pt said she wants to take the heart monitor off and go outside to smoke a cigarette. When the CNA told her that we can't do that without an order, she replied \"well then I'm not going to be a pleasant patient tonight.\"   "

## 2019-10-19 NOTE — PROGRESS NOTES
Pt ripped out strip alarm unit from the wall to keep it from alarming and attempted to get out of bed to the chair. RN and CNA were present. Pt education was reinforced about the need to before getting out of bed.

## 2019-10-19 NOTE — PROGRESS NOTES
"Patient tearful and complaining of severe headache. She has Tylenol and Ketorolac ordered. Patient is refusing both and says it makes it worse. I offered her multiple non-pharmacological methods of pain relief and she declined all. She stated \"the only thing that helps with my headaches is Dilaudid.\" MD aware of pain. No new orders received.     Her HR has been sustaining in the 70s and she is now sleeping 10 minutes after this encounter.  "

## 2019-10-19 NOTE — CARE PLAN
Problem: Communication  Goal: The ability to communicate needs accurately and effectively will improve  Outcome: PROGRESSING AS EXPECTED  Intervention: [place] patient and significant other/support system to call light to alert staff of needs  Flowsheets (Taken 10/18/2019 2227)  Oriented to:: All of the Following : Location of Bathroom, Visiting Policy, Unit Routine, Call Light and Bedside Controls, Bedside Rail Policy, Smoking Policy, Rights and Responsibilities, Bedside Report, and Patient Education Notebook     Problem: Infection  Goal: Will remain free from infection  Outcome: PROGRESSING AS EXPECTED

## 2019-10-19 NOTE — THERAPY
"Occupational Therapy Evaluation completed.   Functional Status: Mod Ievel for necessary activity. Assist at home  Plan of Care: eval only  Discharge Recommendations:  Equipment: NA.   Post-acute therapy : Anticipate that the patient will have no further occupational therapy needs after discharge from the hospital.     See \"Rehab Therapy-Acute\" Patient Summary Report for complete documentation.    "

## 2019-10-19 NOTE — PROGRESS NOTES
Discharge Summary  Educated patient on follow up with PCP within a week of discharge and s/s to look for when returning to the ER. Patient verbalized understanding. PIV removed with no s/s of bleeding or infection at site. Vitals WNL. Escorted patient downstairs via wheelchair for discharge home.

## 2019-10-24 NOTE — PROGRESS NOTES
[facility]. CHW provided resources for rent and energy assistance programs and gave list of housing options for low income/sliding scale. CHW [place] also had pt fill out food is medicine rx and gave pt a bag of food for food security. Pt is currently staying at a friends house but has to be out in a week because she would have to prove residency if she stays there longer than two weeks. Pt lost her job due to injury and hospitalization but is currently in contact with friends that work out at [employer] and trying to get a job there. CHW will do f/u phone call with pt next week to check in and offer any assistance.

## 2019-11-05 NOTE — PROGRESS NOTES
CHW has made three attempts to reach pt since healthcare center visit. Pt goals were met but CHW was to f/u with for another visit to healthcare center for a bag of food. Patient's phone is not receiving calls at this time and due to lack of contact, CHW will d/c pt from [facility] services 11/5. Pt has [facility] contact information.

## 2021-12-09 ENCOUNTER — APPOINTMENT (OUTPATIENT)
Dept: RADIOLOGY | Facility: MEDICAL CENTER | Age: 46
End: 2021-12-09
Attending: EMERGENCY MEDICINE
Payer: COMMERCIAL

## 2021-12-09 ENCOUNTER — HOSPITAL ENCOUNTER (EMERGENCY)
Facility: MEDICAL CENTER | Age: 46
End: 2021-12-09
Attending: EMERGENCY MEDICINE
Payer: COMMERCIAL

## 2021-12-09 VITALS
DIASTOLIC BLOOD PRESSURE: 75 MMHG | WEIGHT: 173.28 LBS | OXYGEN SATURATION: 98 % | HEIGHT: 68 IN | RESPIRATION RATE: 15 BRPM | SYSTOLIC BLOOD PRESSURE: 110 MMHG | HEART RATE: 102 BPM | BODY MASS INDEX: 26.26 KG/M2 | TEMPERATURE: 97.1 F

## 2021-12-09 DIAGNOSIS — S60.222A CONTUSION OF LEFT HAND, INITIAL ENCOUNTER: ICD-10-CM

## 2021-12-09 PROCEDURE — 99283 EMERGENCY DEPT VISIT LOW MDM: CPT

## 2021-12-09 PROCEDURE — 73130 X-RAY EXAM OF HAND: CPT | Mod: LT

## 2021-12-10 NOTE — ED TRIAGE NOTES
Chief Complaint   Patient presents with   • Wrist Pain     left side, x1 week. history of 2 surgeries on wrist. Pt reports bruising to wrist. 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      Pt ambulatory to triage for above complaint. CMS intact.

## 2021-12-10 NOTE — ED PROVIDER NOTES
ED Provider Note    CHIEF COMPLAINT   Chief Complaint   Patient presents with   • Wrist Pain     left side, x1 week. history of 2 surgeries on wrist. Pt reports bruising to wrist. 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        HPI   Erna Garcia is a 46 y.o. female who presents with left hand pain.  She states to prior wrist surgeries by Dr. Gonzalez at [facility].  She has a wrist brace that she wears at work, believes it has been rubbing hard on her hand, shows swelling and bruising at the thenar eminence.  She denies other blower or trauma.  There is painful to the touch, present the last 1 week.  No other complaint today    REVIEW OF SYSTEMS   Musculoskeletal: Left hand pain  Neurologic: No numbness  Skin: Bruising and swelling left thenar eminence      PAST MEDICAL HISTORY   Past Medical History:   Diagnosis Date   • Anxiety     from headaches anticipating getting headache   • ASTHMA    • Head ache    • Sciatica    • Substance abuse (HCC)        FAMILY HISTORY  Family History   Problem Relation Age of Onset   • Cancer Maternal Grandmother    • Heart Disease Maternal Grandfather        SOCIAL HISTORY  Social History     Socioeconomic History   • Marital status:      Spouse name: Not on file   • Number of children: Not on file   • Years of education: Not on file   • Highest education level: Not on file   Occupational History   • Not on file   Tobacco Use   • Smoking status: Current Every Day Smoker     Packs/day: 1.00     Types: Cigarettes   • Smokeless tobacco: Never Used   Substance and Sexual Activity   • Alcohol use: Yes     Comment: socially   • Drug use: Yes     Types: Inhaled     Comment: meth   • Sexual activity: Not on file   Other Topics Concern   • Not on file   Social History Narrative    ** Merged History Encounter **         ** Merged History Encounter **          Social Determinants of Health     Financial Resource Strain:    • Difficulty of Paying Living  "Expenses: Not on file   Food Insecurity:    • Worried About Running Out of Food in the Last Year: Not on file   • Ran Out of Food in the Last Year: Not on file   Transportation Needs:    • Lack of Transportation (Medical): Not on file   • Lack of Transportation (Non-Medical): Not on file   Physical Activity:    • Days of Exercise per Week: Not on file   • Minutes of Exercise per Session: Not on file   Stress:    • Feeling of Stress : Not on file   Social Connections:    • Frequency of Communication with Friends and Family: Not on file   • Frequency of Social Gatherings with Friends and Family: Not on file   • Attends Shinto Services: Not on file   • Active Member of Clubs or Organizations: Not on file   • Attends Club or Organization Meetings: Not on file   • Marital Status: Not on file   Intimate Partner Violence:    • Fear of Current or Ex-Partner: Not on file   • Emotionally Abused: Not on file   • Physically Abused: Not on file   • Sexually Abused: Not on file   Housing Stability:    • Unable to Pay for Housing in the Last Year: Not on file   • Number of Places Lived in the Last Year: Not on file   • Unstable Housing in the Last Year: Not on file       SURGICAL HISTORY  Past Surgical History:   Procedure Laterality Date   • APPENDECTOMY     • GYN SURGERY      lap fibroid removal  2009   • HYSTERECTOMY LAPAROSCOPY     • OTHER ORTHOPEDIC SURGERY      left wrist surgery   • TUBAL LIGATION     • WRIST ORIF         CURRENT MEDICATIONS   Home Medications     Reviewed by Gregory Gao R.N. (Registered Nurse) on 12/09/21 at 1952  Med List Status: Not Addressed   Medication Last Dose Status   propranolol (INDERAL) 10 MG Tab  Active                ALLERGIES   Allergies   Allergen Reactions   • Aspirin      Swelling    • Phenergan [Promethazine] Anxiety       PHYSICAL EXAM  VITAL SIGNS: /72   Pulse (!) 113   Temp 36.6 °C (97.9 °F) (Temporal)   Resp 16   Ht 1.727 m (5' 8\")   Wt 78.6 kg (173 lb 4.5 oz)   " LMP 09/02/2012   SpO2 99%   BMI 26.35 kg/m²   Skin: Ecchymotic change thenar eminence left hand.  No dorsal hand redness or swelling or bruising, no laceration or abrasion.   Vascular: Intact distal capillary refill.   Musculoskeletal: Tenderness left thenar eminence.  Bony tenderness along the first metacarpal.  Carpal bones are nontender.  Lection and extension of fingers intact  Neurologic: Station is intact left hand    RADIOLOGY/PROCEDURES  DX-HAND 3+ LEFT   Final Result         1.  No acute traumatic bony injury.            COURSE & MEDICAL DECISION MAKING  Pertinent Labs & Imaging studies reviewed. (See chart for details)  With bruising and swelling to the left hand, she believes secondary to overuse wearing a specific wrist splint she is used in the past.  Other differential includes spider bite, occult trauma.  No evidence of fracture or dislocation on the x-ray.  No evidence of infection on exam.  Patient states she will go to the store to or follow-up with her orthopedist regarding a better splint for her.  She is discharged well-appearing    FINAL IMPRESSION     1. Contusion of left hand, initial encounter               Electronically signed by: Kevin Hartmann M.D., 12/9/2021 8:27 PM

## 2021-12-10 NOTE — ED NOTES
Patient provided with discharge instructions. Patient verbalized understanding. Patient assisted out of ED with steady gait.

## 2022-04-29 ENCOUNTER — APPOINTMENT (OUTPATIENT)
Dept: RADIOLOGY | Facility: MEDICAL CENTER | Age: 47
End: 2022-04-29
Attending: EMERGENCY MEDICINE
Payer: COMMERCIAL

## 2022-04-29 ENCOUNTER — HOSPITAL ENCOUNTER (EMERGENCY)
Facility: MEDICAL CENTER | Age: 47
End: 2022-04-29
Attending: EMERGENCY MEDICINE
Payer: COMMERCIAL

## 2022-04-29 VITALS
HEIGHT: 68 IN | WEIGHT: 160 LBS | RESPIRATION RATE: 18 BRPM | OXYGEN SATURATION: 100 % | DIASTOLIC BLOOD PRESSURE: 86 MMHG | TEMPERATURE: 97.4 F | HEART RATE: 78 BPM | BODY MASS INDEX: 24.25 KG/M2 | SYSTOLIC BLOOD PRESSURE: 126 MMHG

## 2022-04-29 DIAGNOSIS — F15.10 METHAMPHETAMINE ABUSE (HCC): ICD-10-CM

## 2022-04-29 DIAGNOSIS — R41.82 ALTERED MENTAL STATUS, UNSPECIFIED ALTERED MENTAL STATUS TYPE: ICD-10-CM

## 2022-04-29 DIAGNOSIS — S20.219A CONTUSION OF CHEST WALL, UNSPECIFIED LATERALITY, INITIAL ENCOUNTER: Primary | ICD-10-CM
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
## 2022-04-29 DIAGNOSIS — S01.511A LIP LACERATION, INITIAL ENCOUNTER: ICD-10-CM

## 2022-04-29 LAB
ABO + RH BLD: NORMAL
ABO GROUP BLD: NORMAL
ALBUMIN SERPL BCP-MCNC: 4.2 G/DL (ref 3.2–4.9)
ALBUMIN/GLOB SERPL: 1.7 G/DL
ALP SERPL-CCNC: 90 U/L (ref 30–99)
ALT SERPL-CCNC: 23 U/L (ref 2–50)
ANION GAP SERPL CALC-SCNC: 12 MMOL/L (ref 7–16)
APTT PPP: 27 SEC (ref 24.7–36)
AST SERPL-CCNC: 43 U/L (ref 12–45)
BILIRUB SERPL-MCNC: 0.3 MG/DL (ref 0.1–1.5)
BLD GP AB SCN SERPL QL: NORMAL
BUN SERPL-MCNC: 17 MG/DL (ref 8–22)
CALCIUM SERPL-MCNC: 9 MG/DL (ref 8.5–10.5)
CFT BLD TEG: 3.7 MIN (ref 4.6–9.1)
CFT P HPASE BLD TEG: 3.9 MIN (ref 4.3–8.3)
CHLORIDE SERPL-SCNC: 104 MMOL/L (ref 96–112)
CLOT ANGLE BLD TEG: 76.3 DEGREES (ref 63–78)
CLOT LYSIS 30M P MA LENFR BLD TEG: 2 % (ref 0–2.6)
CO2 SERPL-SCNC: 24 MMOL/L (ref 20–33)
CREAT SERPL-MCNC: 0.93 MG/DL (ref 0.5–1.4)
CT.EXTRINSIC BLD ROTEM: 1 MIN (ref 0.8–2.1)
ERYTHROCYTE [DISTWIDTH] IN BLOOD BY AUTOMATED COUNT: 44.4 FL (ref 35.9–50)
ETHANOL BLD-MCNC: <10.1 MG/DL (ref 0–10)
GFR SERPLBLD CREATININE-BSD FMLA CKD-EPI: 77 ML/MIN/1.73 M 2
GLOBULIN SER CALC-MCNC: 2.5 G/DL (ref 1.9–3.5)
GLUCOSE SERPL-MCNC: 105 MG/DL (ref 65–99)
HCG SERPL QL: NEGATIVE
HCT VFR BLD AUTO: 38.8 % (ref 37–47)
HGB BLD-MCNC: 13.4 G/DL (ref 12–16)
INR PPP: 0.96 (ref 0.87–1.13)
LACTATE BLD-SCNC: 1.3 MMOL/L (ref 0.5–2)
MCF BLD TEG: 58.1 MM (ref 52–69)
MCF.PLATELET INHIB BLD ROTEM: 18.3 MM (ref 15–32)
MCH RBC QN AUTO: 31.2 PG (ref 27–33)
MCHC RBC AUTO-ENTMCNC: 34.5 G/DL (ref 33.6–35)
MCV RBC AUTO: 90.4 FL (ref 81.4–97.8)
PA AA BLD-ACNC: 4.3 % (ref 0–11)
PA ADP BLD-ACNC: 7.1 % (ref 0–17)
PLATELET # BLD AUTO: 390 K/UL (ref 164–446)
PMV BLD AUTO: 8.2 FL (ref 9–12.9)
POTASSIUM SERPL-SCNC: 4 MMOL/L (ref 3.6–5.5)
PROT SERPL-MCNC: 6.7 G/DL (ref 6–8.2)
PROTHROMBIN TIME: 12.5 SEC (ref 12–14.6)
RBC # BLD AUTO: 4.29 M/UL (ref 4.2–5.4)
RH BLD: NORMAL
SODIUM SERPL-SCNC: 140 MMOL/L (ref 135–145)
TEG ALGORITHM TGALG: ABNORMAL
WBC # BLD AUTO: 10.9 K/UL (ref 4.8–10.8)

## 2022-04-29 PROCEDURE — 99291 CRITICAL CARE FIRST HOUR: CPT | Performed by: SURGERY

## 2022-04-29 PROCEDURE — 303747 HCHG EXTRA SUTURE

## 2022-04-29 PROCEDURE — 85576 BLOOD PLATELET AGGREGATION: CPT

## 2022-04-29 PROCEDURE — 85730 THROMBOPLASTIN TIME PARTIAL: CPT

## 2022-04-29 PROCEDURE — 72131 CT LUMBAR SPINE W/O DYE: CPT

## 2022-04-29 PROCEDURE — 700117 HCHG RX CONTRAST REV CODE 255: Performed by: EMERGENCY MEDICINE

## 2022-04-29 PROCEDURE — 85347 COAGULATION TIME ACTIVATED: CPT

## 2022-04-29 PROCEDURE — 305949 HCHG RED TRAUMA ACT PRE-NOTIFY NO CC

## 2022-04-29 PROCEDURE — 85610 PROTHROMBIN TIME: CPT

## 2022-04-29 PROCEDURE — 83605 ASSAY OF LACTIC ACID: CPT

## 2022-04-29 PROCEDURE — 72128 CT CHEST SPINE W/O DYE: CPT

## 2022-04-29 PROCEDURE — 86850 RBC ANTIBODY SCREEN: CPT

## 2022-04-29 PROCEDURE — 71045 X-RAY EXAM CHEST 1 VIEW: CPT

## 2022-04-29 PROCEDURE — 86900 BLOOD TYPING SEROLOGIC ABO: CPT

## 2022-04-29 PROCEDURE — 85027 COMPLETE CBC AUTOMATED: CPT

## 2022-04-29 PROCEDURE — 73100 X-RAY EXAM OF WRIST: CPT | Mod: LT

## 2022-04-29 PROCEDURE — 99285 EMERGENCY DEPT VISIT HI MDM: CPT

## 2022-04-29 PROCEDURE — 36415 COLL VENOUS BLD VENIPUNCTURE: CPT

## 2022-04-29 PROCEDURE — 70450 CT HEAD/BRAIN W/O DYE: CPT

## 2022-04-29 PROCEDURE — 82077 ASSAY SPEC XCP UR&BREATH IA: CPT

## 2022-04-29 PROCEDURE — 72170 X-RAY EXAM OF PELVIS: CPT

## 2022-04-29 PROCEDURE — 304999 HCHG REPAIR-SIMPLE/INTERMED LEVEL 1

## 2022-04-29 PROCEDURE — 72125 CT NECK SPINE W/O DYE: CPT

## 2022-04-29 PROCEDURE — 700101 HCHG RX REV CODE 250: Performed by: EMERGENCY MEDICINE

## 2022-04-29 PROCEDURE — 304217 HCHG IRRIGATION SYSTEM

## 2022-04-29 PROCEDURE — 86901 BLOOD TYPING SEROLOGIC RH(D): CPT

## 2022-04-29 PROCEDURE — 84703 CHORIONIC GONADOTROPIN ASSAY: CPT

## 2022-04-29 PROCEDURE — 85384 FIBRINOGEN ACTIVITY: CPT

## 2022-04-29 PROCEDURE — 80053 COMPREHEN METABOLIC PANEL: CPT

## 2022-04-29 PROCEDURE — 71260 CT THORAX DX C+: CPT

## 2022-04-29 RX ORDER — BUPIVACAINE HYDROCHLORIDE AND EPINEPHRINE 5; 5 MG/ML; UG/ML
10 INJECTION, SOLUTION EPIDURAL; INTRACAUDAL; PERINEURAL ONCE
Status: COMPLETED | OUTPATIENT
Start: 2022-04-29 | End: 2022-04-29

## 2022-04-29 RX ADMIN — BUPIVACAINE HYDROCHLORIDE AND EPINEPHRINE 10 ML: 5; 5 INJECTION, SOLUTION EPIDURAL; INTRACAUDAL; PERINEURAL at 19:30

## 2022-04-29 RX ADMIN — IOHEXOL 97 ML: 350 INJECTION, SOLUTION INTRAVENOUS at 18:47

## 2022-04-30 NOTE — ED NOTES
Small plastic bag containing a clear crystal substance removed from patient's bra. Patient reports meth use today
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
## 2022-04-30 NOTE — ED NOTES
Patient given discharge instructions and verbalized understanding appropriately, denies any further questions or concerns at this time. Patient is alert and oriented and ambulates with a steady gait. Discharged to self.

## 2023-02-15 ENCOUNTER — PATIENT MESSAGE (OUTPATIENT)
Dept: MEDICAL GROUP | Facility: CLINIC | Age: 48
End: 2023-02-15
Payer: COMMERCIAL

## 2023-02-16 ENCOUNTER — OFFICE VISIT (OUTPATIENT)
Dept: MEDICAL GROUP | Facility: CLINIC | Age: 48
End: 2023-02-16
Payer: COMMERCIAL

## 2023-02-16 VITALS
HEART RATE: 92 BPM | HEIGHT: 68 IN | BODY MASS INDEX: 27.13 KG/M2 | WEIGHT: 179 LBS | RESPIRATION RATE: 16 BRPM | SYSTOLIC BLOOD PRESSURE: 104 MMHG | OXYGEN SATURATION: 96 % | TEMPERATURE: 97.3 F | DIASTOLIC BLOOD PRESSURE: 72 MMHG

## 2023-02-16 DIAGNOSIS — F43.21 GRIEVING: ICD-10-CM

## 2023-02-16 PROCEDURE — 99203 OFFICE O/P NEW LOW 30 MIN: CPT | Mod: GE | Performed by: STUDENT IN AN ORGANIZED HEALTH CARE EDUCATION/TRAINING PROGRAM

## 2023-02-16 RX ORDER — FLUOXETINE HYDROCHLORIDE 20 MG/1
20 CAPSULE ORAL DAILY
Qty: 60 CAPSULE | Refills: 0 | Status: SHIPPED | OUTPATIENT
Start: 2023-02-16 | End: 2023-04-18

## 2023-02-16 ASSESSMENT — PATIENT HEALTH QUESTIONNAIRE - PHQ9
CLINICAL INTERPRETATION OF PHQ2 SCORE: 5
SUM OF ALL RESPONSES TO PHQ QUESTIONS 1-9: 22
5. POOR APPETITE OR OVEREATING: 3 - NEARLY EVERY DAY

## 2023-02-16 ASSESSMENT — ANXIETY QUESTIONNAIRES
4. TROUBLE RELAXING: NEARLY EVERY DAY
3. WORRYING TOO MUCH ABOUT DIFFERENT THINGS: NEARLY EVERY DAY
2. NOT BEING ABLE TO STOP OR CONTROL WORRYING: NEARLY EVERY DAY
GAD7 TOTAL SCORE: 19
7. FEELING AFRAID AS IF SOMETHING AWFUL MIGHT HAPPEN: NEARLY EVERY DAY
5. BEING SO RESTLESS THAT IT IS HARD TO SIT STILL: MORE THAN HALF THE DAYS
1. FEELING NERVOUS, ANXIOUS, OR ON EDGE: NEARLY EVERY DAY
6. BECOMING EASILY ANNOYED OR IRRITABLE: MORE THAN HALF THE DAYS

## 2023-02-16 ASSESSMENT — FIBROSIS 4 INDEX: FIB4 SCORE: 1.03

## 2023-02-17 NOTE — ASSESSMENT & PLAN NOTE
- Patient will have an appointment with Dr.: Solorio as soon as possible, and also knows that she is available to make an appointment with me at any time; I explicitly discussed ED precautions with the patient should she develop any thoughts of SI/HI/VI  -The patient has many symptoms of anxiety and depressed mood. Patient denies SI/HI. Discussed ED precautions if patient should feel desire for SI/HI. Patient provided with behavioral health/counseling information resource sheet, and also counseled on diet, exercise, mindfulness apps on phone/computer.    SSRI plan: The patient has been prescribed fluoxetine and counseled on its therapeutic effect, side effects, how long it may take to work. The patient has also been counseled on the need to call emergency services or go to the ED if they are having feelings of harming self or taking actions to harm self. Furthermore, the patient has been counseled on keeping weapons and firearms out of the home, or made inaccessible by keeping them in a locked safe.    The patient has also been counseled on the need for behavioral therapy in conjunction with pharmacotherapy, and we will try and put her in for an appointment with Dr. Frances as soon as possible    The patient will return next week to further discuss her mood, and will also see me in about a month to see if the fluoxetine has been helpful or not

## 2023-02-17 NOTE — PROGRESS NOTES
Subjective:     CC:  Diagnoses of BMI 27.0-27.9,adult and Grieving were pertinent to this visit.    HISTORY OF THE PRESENT ILLNESS: Patient is a 47 y.o. female. This pleasant patient is here today to establish care and discuss loss of her . His/her prior PCP was none.    Problem   Grieving    - Patient presents today to establish care and also because she is giving the loss of her  who passed away 2 weeks ago on a Sunday  - Per patient, her  had a ruptured aortic aneurysm, and passed away in her arms 2 weeks ago  - Patient states that she has been sad, crying daily since that time  - Patient states she has a great support system in town with children, 3, ranging in age from 27-31, and also has 3 grandchildren who also live in town  - Patient currently lives with an older male , whom she cares for, states that she feels safe at home and that she also has enough food to make it through the month  - Patient denies guilt, insomnia, anger, irritability, delusions/hallucinations, SI/HI/VI     Bmi 27.0-27.9,Adult    - Patient has a BMI of 27.2, is currently grieving the loss of her , and has been feeling too sad to go out and exercise         Current Outpatient Medications Ordered in Epic   Medication Sig Dispense Refill    FLUoxetine (PROZAC) 20 MG Cap Take 1 Capsule by mouth every day. 60 Capsule 0    propranolol (INDERAL) 10 MG Tab Take 10 mg by mouth as needed (Tremors).       No current [facility]-ordered facility-administered medications on file.       Health Maintenance: Not discussed at this visit as the patient is acutely grieving the loss of her .    ROS:   Gen: no fevers/chills, no changes in weight  Eyes: no changes in vision  ENT: no changes in hearing  Pulm: no sob, no cough  CV: no chest pain, no palpitations  GI: no nausea/vomiting, no diarrhea  MSk: no myalgias  Skin: no rash  Neuro: no headaches, no numbness/tingling      Objective:     Exam: /72 (BP Location:  "Left arm, Patient Position: Sitting, BP Cuff Size: Adult)   Pulse 92   Temp 36.3 °C (97.3 °F) (Temporal)   Resp 16   Ht 1.727 m (5' 8\")   Wt 81.2 kg (179 lb)   SpO2 96%  Body mass index is 27.22 kg/m².    General: Normal appearing. No distress.  HEENT: Normocephalic. Eyes conjunctiva clear lids without ptosis, pupils equal and reactive to light accommodation, ears normal shape and contour, canals are clear bilaterally, nasal mucosa benign, oropharynx is without erythema, edema or exudates.   Neck: Supple without JVD or bruit. Thyroid is not enlarged.  Pulmonary: Clear to ausculation.  Normal effort. No rales, ronchi, or wheezing.  Cardiovascular: Regular rate and rhythm without murmur. Carotid and radial pulses are intact and equal bilaterally.  Abdomen: Soft, nontender, nondistended. Normal bowel sounds. Liver and spleen are not palpable  Neurologic: Grossly nonfocal  Lymph: No cervical or supraclavicular lymph nodes are palpable  Skin: Warm and dry.  No obvious lesions.  Musculoskeletal: Normal gait. No extremity cyanosis, clubbing, or edema.  Psych: Normal mood and affect. Alert and oriented x3. Judgment and insight is normal.      Assessment & Plan:   47 y.o. female with the following -    Problem List Items Addressed This Visit       Grieving     - Patient will have an appointment with Dr.: Solorio as soon as possible, and also knows that she is available to make an appointment with me at any time; I explicitly discussed ED precautions with the patient should she develop any thoughts of SI/HI/VI  -The patient has many symptoms of anxiety and depressed mood. Patient denies SI/HI. Discussed ED precautions if patient should feel desire for SI/HI. Patient provided with behavioral health/counseling information resource sheet, and also counseled on diet, exercise, mindfulness apps on phone/computer.    SSRI plan: The patient has been prescribed fluoxetine and counseled on its therapeutic effect, side effects, how " long it may take to work. The patient has also been counseled on the need to call emergency services or go to the ED if they are having feelings of harming self or taking actions to harm self. Furthermore, the patient has been counseled on keeping weapons and firearms out of the home, or made inaccessible by keeping them in a locked safe.    The patient has also been counseled on the need for behavioral therapy in conjunction with pharmacotherapy, and we will try and put her in for an appointment with Dr. Frances as soon as possible    The patient will return next week to further discuss her mood, and will also see me in about a month to see if the fluoxetine has been helpful or not           BMI 27.0-27.9,adult     - Importance of physical activity discussed with patient and we also discussed checking a lipid panel and HbA1c; patient denies a family history of diabetes  - Patient counseled to fast before getting the labs done, and will follow-up with me on this lab work next week         Relevant Orders    HEMOGLOBIN A1C    Lipid Profile         Return in about 1 week (around 2/23/2023).    Grupo Garcia MD   PGY-3 Family Medicine Resident   [facility]

## 2023-02-17 NOTE — ASSESSMENT & PLAN NOTE
- Importance of physical activity discussed with patient and we also discussed checking a lipid panel and HbA1c; patient denies a family history of diabetes  - Patient counseled to fast before getting the labs done, and will follow-up with me on this lab work next week

## 2023-02-23 ENCOUNTER — HOSPITAL ENCOUNTER (OUTPATIENT)
Dept: LAB | Facility: MEDICAL CENTER | Age: 48
End: 2023-02-23
Attending: STUDENT IN AN ORGANIZED HEALTH CARE EDUCATION/TRAINING PROGRAM
Payer: COMMERCIAL

## 2023-02-23 LAB
CHOLEST SERPL-MCNC: 185 MG/DL (ref 100–199)
EST. AVERAGE GLUCOSE BLD GHB EST-MCNC: 111 MG/DL
FASTING STATUS PATIENT QL REPORTED: NORMAL
HBA1C MFR BLD: 5.5 % (ref 4–5.6)
HDLC SERPL-MCNC: 51 MG/DL
LDLC SERPL CALC-MCNC: 103 MG/DL
TRIGL SERPL-MCNC: 153 MG/DL (ref 0–149)

## 2023-02-23 PROCEDURE — 80061 LIPID PANEL: CPT

## 2023-02-23 PROCEDURE — 36415 COLL VENOUS BLD VENIPUNCTURE: CPT

## 2023-02-23 PROCEDURE — 83036 HEMOGLOBIN GLYCOSYLATED A1C: CPT

## 2023-02-28 ENCOUNTER — OFFICE VISIT (OUTPATIENT)
Dept: MEDICAL GROUP | Facility: CLINIC | Age: 48
End: 2023-02-28
Payer: COMMERCIAL

## 2023-02-28 VITALS
SYSTOLIC BLOOD PRESSURE: 110 MMHG | BODY MASS INDEX: 26.98 KG/M2 | RESPIRATION RATE: 16 BRPM | TEMPERATURE: 97.7 F | DIASTOLIC BLOOD PRESSURE: 75 MMHG | HEART RATE: 81 BPM | WEIGHT: 178 LBS | HEIGHT: 68 IN

## 2023-02-28 DIAGNOSIS — F43.21 GRIEVING: ICD-10-CM

## 2023-02-28 DIAGNOSIS — M19.042 OSTEOARTHRITIS OF METACARPOPHALANGEAL (MCP) JOINT OF LEFT THUMB: ICD-10-CM

## 2023-02-28 PROCEDURE — 99213 OFFICE O/P EST LOW 20 MIN: CPT | Mod: GE | Performed by: STUDENT IN AN ORGANIZED HEALTH CARE EDUCATION/TRAINING PROGRAM

## 2023-02-28 RX ORDER — CYCLOBENZAPRINE HCL 5 MG
5-10 TABLET ORAL 3 TIMES DAILY PRN
Qty: 30 TABLET | Refills: 0 | Status: SHIPPED | OUTPATIENT
Start: 2023-02-28 | End: 2023-03-05

## 2023-02-28 RX ORDER — MELOXICAM 7.5 MG/1
7.5 TABLET ORAL DAILY
Qty: 30 TABLET | Refills: 0 | Status: SHIPPED | OUTPATIENT
Start: 2023-02-28 | End: 2023-04-07 | Stop reason: SDUPTHER

## 2023-02-28 ASSESSMENT — FIBROSIS 4 INDEX: FIB4 SCORE: 1.03

## 2023-03-01 ENCOUNTER — TELEPHONE (OUTPATIENT)
Dept: MEDICAL GROUP | Facility: CLINIC | Age: 48
End: 2023-03-01

## 2023-03-01 NOTE — PROGRESS NOTES
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 Condon  - The patient states that she has been experiencing a sensation of fullness and tightness with occasional spasm of the thumb ever since the injection  - She denies that there has been any redness or warmth at the injection site, no discharge from the injection site, no fevers or chills  - Patient states she has been taking Tylenol and ibuprofen without any effect and is wondering if there is something else I can give her to help with the pain     Grieving    - The patient is here for follow-up of her grieving process; the patient recently had her  passed away; she started fluoxetine at our last visit, but decided to stop taking after few days that she was worried about what it would be like when she decided she was worried to come off of the medicine regarding withdrawal effects  - The patient states she has been feeling  - The patient denies SI/HI/VI     Bmi 27.0-27.9,Adult    - Patient has a BMI of 27.2, is currently grieving the loss of her , and has been feeling too sad to go out and exercise  - The patient is here for follow-up, she recently had an HbA1c and lipid panel done, and is here to go over lab results         Current Outpatient Medications Ordered in Epic   Medication Sig Dispense Refill    meloxicam (MOBIC) 7.5 MG Tab Take 1 Tablet by mouth every day. 30 Tablet 0    cyclobenzaprine (FLEXERIL) 5 mg tablet Take 1-2 Tablets by mouth 3 times a day as needed for Muscle Spasms for up to 5 days. 30 Tablet 0    FLUoxetine (PROZAC) 20 MG Cap Take 1 Capsule by mouth every day. 60 Capsule 0    propranolol (INDERAL) 10 MG Tab Take 10 mg by mouth as needed (Tremors).       No current [facility]-ordered  "facility-administered medications on file.       ROS:  Gen: no fevers/chills, no changes in weight  Eyes: no changes in vision  ENT: no changes in hearing  Pulm: no sob, no cough  CV: no chest pain, no palpitations  GI: no nausea/vomiting, no diarrhea  MSk: no myalgias  Skin: no rash  Neuro: no headaches, no numbness/tingling      Objective:     Exam:  /75 (BP Location: Right arm, Patient Position: Sitting, BP Cuff Size: Adult)   Pulse 81   Temp 36.5 °C (97.7 °F) (Temporal)   Resp 16   Ht 1.727 m (5' 8\")   Wt 80.7 kg (178 lb)   LMP 09/02/2012   BMI 27.06 kg/m²  Body mass index is 27.06 kg/m².    Gen: Alert and oriented, No apparent distress.  Neck: Neck is supple without lymphadenopathy.  Lungs: Normal effort, CTA bilaterally, no wheezes, rhonchi, or rales  CV: Regular rate and rhythm. No murmurs, rubs, or gallops.  Ext: No clubbing, cyanosis, edema.  The bilateral hands appear unremarkable, and the left MCP appears within normal limits without any erythema, edema or tenderness to palpation      Assessment & Plan:     47 y.o. female with the following -     Problem List Items Addressed This Visit       Grieving     - I have spoken with the patient and reassured her that fluoxetine has the least withdrawal effects of many of the SSRI medications; regardless, the patient is acutely grieving, and that is appropriate at this time, so I have reassured her that she does not need to take the fluoxetine  - The patient states she is not only any weapons at home, does not have any SI/HI/VI, and we discussed going to the ED immediately if she develops any of these feelings  - Patient is also agreeable to setting up a appointment with Dr. Frances or with a behavioral therapist of her choice  - Patient will follow-up in approximately a couple weeks to discuss her medication therapy and progress with psychotherapy         BMI 27.0-27.9,adult     - The patient's ASCVD score is 0.5%, no need for statin medication, " and the patient was counseled on the pooled cohort equation, pathophysiology and clinical course of hyperlipidemia and associated risks  - Additionally, the patient's HbA1c was 5.5, no need for intervention at this time, and patient was counseled on pathophysiology and clinical course of diabetes and associated risks         Osteoarthritis of metacarpophalangeal (MCP) joint of left thumb     - I advised the patient to see the orthopedic surgeon as soon as possible  - In the meantime, I have given the patient a small amount of cyclobenzaprine and meloxicam  - I reviewed the patient's kidney function from April 2022, found that it was within normal limits, and I have advised her that she can take up to 15 mg of meloxicam per 24-hour  - I have also advised the patient that she can take up to 40 mg of cyclobenzaprine daily  - The patient is also aware that she can take continue to take Tylenol, up to 4 mg daily, but not to exceed that amount; I also explicitly told her that she should not take any ibuprofen while she is taking the meloxicam  - The patient will follow-up with orthopedics soon as possible, and I have given her ED precautions for joint infection         Relevant Medications    meloxicam (MOBIC) 7.5 MG Tab    cyclobenzaprine (FLEXERIL) 5 mg tablet         Return in about 1 month (around 3/28/2023).    Grupo Garcia MD   PGY-3 Family Medicine Resident   [facility]

## 2023-03-01 NOTE — ASSESSMENT & PLAN NOTE
- I advised the patient to see the orthopedic surgeon as soon as possible  - In the meantime, I have given the patient a small amount of cyclobenzaprine and meloxicam  - I reviewed the patient's kidney function from April 2022, found that it was within normal limits, and I have advised her that she can take up to 15 mg of meloxicam per 24-hour  - I have also advised the patient that she can take up to 40 mg of cyclobenzaprine daily  - The patient is also aware that she can take continue to take Tylenol, up to 4 mg daily, but not to exceed that amount; I also explicitly told her that she should not take any ibuprofen while she is taking the meloxicam  - The patient will follow-up with orthopedics soon as possible, and I have given her ED precautions for joint infection

## 2023-03-01 NOTE — ASSESSMENT & PLAN NOTE
- I have spoken with the patient and reassured her that fluoxetine has the least withdrawal effects of many of the SSRI medications; regardless, the patient is acutely grieving, and that is appropriate at this time, so I have reassured her that she does not need to take the fluoxetine  - The patient states she is not only any weapons at home, does not have any SI/HI/VI, and we discussed going to the ED immediately if she develops any of these feelings  - Patient is also agreeable to setting up a appointment with Dr. Frances or with a behavioral therapist of her choice  - Patient will follow-up in approximately a couple weeks to discuss her medication therapy and progress with psychotherapy

## 2023-03-01 NOTE — ASSESSMENT & PLAN NOTE
- The patient's ASCVD score is 0.5%, no need for statin medication, and the patient was counseled on the pooled cohort equation, pathophysiology and clinical course of hyperlipidemia and associated risks  - Additionally, the patient's HbA1c was 5.5, no need for intervention at this time, and patient was counseled on pathophysiology and clinical course of diabetes and associated risks

## 2023-03-02 NOTE — TELEPHONE ENCOUNTER
VOICEMAIL  1. Caller Name: [facility]                       Call Back Number: [phone]    2. Message: Maritza with [facility] needs a alternate valid code for the lab order- service was done on 02/23/23. The one provided is a secondary code. Please return her call.     3. Patient approves office to leave a detailed voicemail/MyChart message: N\A

## 2023-03-07 NOTE — TELEPHONE ENCOUNTER
VOICEMAIL  1. Caller Name: Marit[facility]                      Call Back Number: [phone]    2. Message: Maritza with [facility] calling in for a different DX code.     3. Patient approves office to leave a detailed voicemail/MyChart message: N\A

## 2023-03-21 ENCOUNTER — APPOINTMENT (OUTPATIENT)
Dept: URGENT CARE | Facility: PHYSICIAN GROUP | Age: 48
End: 2023-03-21
Payer: COMMERCIAL

## 2023-04-07 RX ORDER — PROPRANOLOL HYDROCHLORIDE 10 MG/1
10 TABLET ORAL PRN
Qty: 90 TABLET | Refills: 0 | Status: SHIPPED | OUTPATIENT
Start: 2023-04-07 | End: 2023-04-17

## 2023-04-07 RX ORDER — MELOXICAM 7.5 MG/1
7.5 TABLET ORAL DAILY
Qty: 30 TABLET | Refills: 0 | Status: SHIPPED | OUTPATIENT
Start: 2023-04-07 | End: 2023-05-08

## 2023-04-17 ENCOUNTER — TELEMEDICINE (OUTPATIENT)
Dept: MEDICAL GROUP | Facility: CLINIC | Age: 48
End: 2023-04-17
Payer: COMMERCIAL

## 2023-04-17 DIAGNOSIS — G25.0 ESSENTIAL TREMOR: ICD-10-CM

## 2023-04-17 DIAGNOSIS — M54.50 CHRONIC BILATERAL LOW BACK PAIN WITHOUT SCIATICA: ICD-10-CM

## 2023-04-17 DIAGNOSIS — G89.29 CHRONIC BILATERAL LOW BACK PAIN WITHOUT SCIATICA: ICD-10-CM

## 2023-04-17 PROCEDURE — 99213 OFFICE O/P EST LOW 20 MIN: CPT | Mod: GT,GE | Performed by: STUDENT IN AN ORGANIZED HEALTH CARE EDUCATION/TRAINING PROGRAM

## 2023-04-17 RX ORDER — PROPRANOLOL HYDROCHLORIDE 20 MG/1
20 TABLET ORAL 3 TIMES DAILY
Qty: 90 TABLET | Refills: 3 | Status: SHIPPED | OUTPATIENT
Start: 2023-04-17 | End: 2023-07-17 | Stop reason: SDUPTHER

## 2023-04-17 NOTE — ASSESSMENT & PLAN NOTE
- Bilateral action tremor  - Will uptitrate dose of propranolol as needed; currently, patient is on a very low-dose of propanolol 3 times daily  - Patient will follow-up in approximate 1 month

## 2023-04-17 NOTE — PROGRESS NOTES
This visit was completed via Zoom due to the restrictions of COVID-19 pandemic. Consent was obtained from the patient to conduct the visit over a video call. All issues as below were discussed and addressed but no physical exam was performed unless allowed by visual confirmation on exam.  If it was felt that the patient should be evaluated in clinic then they were directed there; the patient verbally consented to zoom televideo visit.      Subjective:     CC: Low back pain and essential tremor    HPI:   Erna presents today with:    Problem   Chronic Bilateral Low Back Pain Without Sciatica    - Patient states that her lower back pain recently became aggravated, and she is wondering what she can do about it; it is no different from prior flareup episodes  - Patient has a history of chronic low back pain which radiates across the entire bilateral lower back; patient denies any sciatica symptoms; patient denies fever, chills, weight changes, recent dental procedures, urinary or bowel incontinence, weakness or paralysis of the lower extremities       Essential Tremor    - Patient has a chronic history of essential tremor, currently on propranolol 10 mg 3 times daily  - Patient states that over the last couple of weeks, her tremor in the bilateral hands has become a bit worse, and she is sometimes having difficulty holding a beverage when she wakes up in the morning  - Patient states that she has been taking her medication as directed  - Patient has been undergoing significant stress with the recent passing of her significant other, and her worsening tremor coincides with his passing         Current Outpatient Medications Ordered in Epic   Medication Sig Dispense Refill    propranolol (INDERAL) 20 MG Tab Take 1 Tablet by mouth 3 times a day. 90 Tablet 3    meloxicam (MOBIC) 7.5 MG Tab Take 1 Tablet by mouth every day. 30 Tablet 0    FLUoxetine (PROZAC) 20 MG Cap Take 1 Capsule by mouth every day. 60 Capsule 0     No  current Epic-ordered facility-administered medications on file.       ROS:  Gen: no fevers/chills, no changes in weight  Eyes: no changes in vision  ENT: no changes in hearing  Pulm: no sob, no cough  CV: no chest pain, no palpitations  GI: no nausea/vomiting, no diarrhea  MSk: no myalgias  Skin: no rash  Neuro: no headaches, no numbness/tingling      Objective:     None; Zoom visit    Assessment & Plan:     47 y.o. female with the following -     Problem List Items Addressed This Visit       Chronic bilateral low back pain without sciatica     - Patient has no red flag symptoms of low back pain  - Patient agreeable to trying a structured exercise program physical therapy  - Patient follow-up in approximate 1 month         Relevant Orders    Referral to Physical Therapy    Essential tremor     - Bilateral action tremor  - Will uptitrate dose of propranolol as needed; currently, patient is on a very low-dose of propanolol 3 times daily  - Patient will follow-up in approximate 1 month              Return in about 1 month (around 5/17/2023).    Grupo Garcia MD   PGY-3 Family Medicine Resident   [facility]

## 2023-04-17 NOTE — ASSESSMENT & PLAN NOTE
- Patient has no red flag symptoms of low back pain  - Patient agreeable to trying a structured exercise program physical therapy  - Patient follow-up in approximate 1 month

## 2023-04-20 ENCOUNTER — HOSPITAL ENCOUNTER (OUTPATIENT)
Dept: RADIOLOGY | Facility: MEDICAL CENTER | Age: 48
End: 2023-04-20
Attending: STUDENT IN AN ORGANIZED HEALTH CARE EDUCATION/TRAINING PROGRAM
Payer: COMMERCIAL

## 2023-04-20 DIAGNOSIS — Z12.31 VISIT FOR SCREENING MAMMOGRAM: ICD-10-CM

## 2023-04-20 PROCEDURE — 77063 BREAST TOMOSYNTHESIS BI: CPT

## 2023-05-08 RX ORDER — MELOXICAM 7.5 MG/1
7.5 TABLET ORAL DAILY
Qty: 30 TABLET | Refills: 0 | Status: SHIPPED | OUTPATIENT
Start: 2023-05-08 | End: 2023-06-09

## 2023-05-22 ENCOUNTER — PHYSICAL THERAPY (OUTPATIENT)
Dept: PHYSICAL THERAPY | Facility: REHABILITATION | Age: 48
End: 2023-05-22
Attending: STUDENT IN AN ORGANIZED HEALTH CARE EDUCATION/TRAINING PROGRAM
Payer: COMMERCIAL

## 2023-05-22 DIAGNOSIS — M54.50 CHRONIC RIGHT-SIDED LOW BACK PAIN, UNSPECIFIED WHETHER SCIATICA PRESENT: ICD-10-CM

## 2023-05-22 DIAGNOSIS — G89.29 CHRONIC RIGHT-SIDED LOW BACK PAIN, UNSPECIFIED WHETHER SCIATICA PRESENT: ICD-10-CM

## 2023-05-22 PROCEDURE — 97162 PT EVAL MOD COMPLEX 30 MIN: CPT

## 2023-05-22 ASSESSMENT — ENCOUNTER SYMPTOMS
PAIN SCALE: 5
PAIN SCALE AT LOWEST: 2
PAIN SCALE AT HIGHEST: 10
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
## 2023-06-07 ENCOUNTER — HOSPITAL ENCOUNTER (OUTPATIENT)
Facility: MEDICAL CENTER | Age: 48
End: 2023-06-07
Attending: OBSTETRICS & GYNECOLOGY
Payer: COMMERCIAL

## 2023-06-07 ENCOUNTER — GYNECOLOGY VISIT (OUTPATIENT)
Dept: OBGYN | Facility: CLINIC | Age: 48
End: 2023-06-07
Payer: COMMERCIAL

## 2023-06-07 VITALS
HEIGHT: 68 IN | BODY MASS INDEX: 26.31 KG/M2 | DIASTOLIC BLOOD PRESSURE: 84 MMHG | SYSTOLIC BLOOD PRESSURE: 126 MMHG | WEIGHT: 173.6 LBS

## 2023-06-07 DIAGNOSIS — N89.8 VAGINAL DISCHARGE: ICD-10-CM

## 2023-06-07 DIAGNOSIS — R10.2 PELVIC CRAMPING: Primary | ICD-10-CM

## 2023-06-07 DIAGNOSIS — N93.9 VAGINA BLEEDING: ICD-10-CM

## 2023-06-07 PROCEDURE — 87480 CANDIDA DNA DIR PROBE: CPT

## 2023-06-07 PROCEDURE — 3074F SYST BP LT 130 MM HG: CPT | Performed by: OBSTETRICS & GYNECOLOGY

## 2023-06-07 PROCEDURE — 87510 GARDNER VAG DNA DIR PROBE: CPT

## 2023-06-07 PROCEDURE — 3079F DIAST BP 80-89 MM HG: CPT | Performed by: OBSTETRICS & GYNECOLOGY

## 2023-06-07 PROCEDURE — 87491 CHLMYD TRACH DNA AMP PROBE: CPT

## 2023-06-07 PROCEDURE — 87591 N.GONORRHOEAE DNA AMP PROB: CPT

## 2023-06-07 PROCEDURE — 99202 OFFICE O/P NEW SF 15 MIN: CPT | Performed by: OBSTETRICS & GYNECOLOGY

## 2023-06-07 PROCEDURE — 87660 TRICHOMONAS VAGIN DIR PROBE: CPT

## 2023-06-07 ASSESSMENT — FIBROSIS 4 INDEX: FIB4 SCORE: 1.06

## 2023-06-07 NOTE — PROGRESS NOTES
Patient here for GYN transfer of care from    Last pap: 2016 had biopsy done   Last mammo 2023 WNL  # [phone]  Pt states bleeding and cramping started around 1/2023 not sure if due to a lot of stress in her life
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
## 2023-06-08 LAB
C TRACH DNA GENITAL QL NAA+PROBE: NEGATIVE
CANDIDA DNA VAG QL PROBE+SIG AMP: NEGATIVE
G VAGINALIS DNA VAG QL PROBE+SIG AMP: POSITIVE
N GONORRHOEA DNA GENITAL QL NAA+PROBE: NEGATIVE
SPECIMEN SOURCE: NORMAL
T VAGINALIS DNA VAG QL PROBE+SIG AMP: NEGATIVE

## 2023-06-08 RX ORDER — METRONIDAZOLE 7.5 MG/G
1 GEL VAGINAL
Qty: 5 EACH | Refills: 0 | Status: SHIPPED | OUTPATIENT
Start: 2023-06-08 | End: 2023-06-13

## 2023-06-09 RX ORDER — MELOXICAM 7.5 MG/1
7.5 TABLET ORAL DAILY
Qty: 30 TABLET | Refills: 0 | Status: SHIPPED | OUTPATIENT
Start: 2023-06-09

## 2023-06-14 ENCOUNTER — PHYSICAL THERAPY (OUTPATIENT)
Dept: PHYSICAL THERAPY | Facility: REHABILITATION | Age: 48
End: 2023-06-14
Attending: STUDENT IN AN ORGANIZED HEALTH CARE EDUCATION/TRAINING PROGRAM
Payer: COMMERCIAL

## 2023-06-14 DIAGNOSIS — G89.29 CHRONIC RIGHT-SIDED LOW BACK PAIN, UNSPECIFIED WHETHER SCIATICA PRESENT: ICD-10-CM

## 2023-06-14 DIAGNOSIS — M54.50 CHRONIC RIGHT-SIDED LOW BACK PAIN, UNSPECIFIED WHETHER SCIATICA PRESENT: ICD-10-CM

## 2023-06-14 PROCEDURE — 97110 THERAPEUTIC EXERCISES: CPT

## 2023-06-14 NOTE — OP THERAPY DAILY TREATMENT
"  Outpatient Physical Therapy  DAILY TREATMENT     [facility] Physical [address].  [address]  [place] [address]  Phone:  [phone]  Fax:  [phone]    Date: 06/14/2023    Patient: Eran Garcia  YOB: 1975  MRN: [id number]     Time Calculation    Start time: 1315  Stop time: 1355 Time Calculation (min): 40 minutes         Chief Complaint: Back Problem    Visit #: 2    SUBJECTIVE:  Pt reports no changes since initial eval. Reports using inversion table at home is very helpful.    Aggs: Standing >5 minutes    OBJECTIVE:  Current objective measures:   Positive compression, distraction, thigh thrust,     R ASIS superior/anterior  L PSIS superior              Therapeutic Exercises (CPT 43398):       Therapeutic Exercise Summary: -Self-MET, break the stick, 4x5\" hold  -Pelvic foam roller series, x15' - SL KFO, marches, w/ hands on ASIS for self-cueing  -Core activation progression w/ anterior pelvic tilt and TA brace,m x3'  -Bridge, 2x fatigue  -Bird dogs, 5\" hold x 3'    HEP: Bridge, bird dogs, knee fall out          Time-based treatments/modalities:    Physical Therapy Timed Treatment Charges  Therapeutic exercise minutes (CPT 72168): 40 minutes        ASSESSMENT:   Response to treatment: Pt demo s/s consistent witj SIJ dysfunction, tolerated SI exercises well, reporting decreased symptoms in gait. Demo good TA control/core engagement. Demo overall decreased extensor endurance. Progressed HEP top reflect SIJ-focused exercises, handout provided.    PLAN/RECOMMENDATIONS:   Plan for treatment: therapy treatment to continue next visit.  Planned interventions for next visit: continue with current treatment.         "

## 2023-06-22 ENCOUNTER — APPOINTMENT (OUTPATIENT)
Dept: PHYSICAL THERAPY | Facility: REHABILITATION | Age: 48
End: 2023-06-22
Attending: STUDENT IN AN ORGANIZED HEALTH CARE EDUCATION/TRAINING PROGRAM
Payer: COMMERCIAL

## 2023-06-28 ENCOUNTER — PHYSICAL THERAPY (OUTPATIENT)
Dept: PHYSICAL THERAPY | Facility: REHABILITATION | Age: 48
End: 2023-06-28
Attending: STUDENT IN AN ORGANIZED HEALTH CARE EDUCATION/TRAINING PROGRAM
Payer: COMMERCIAL

## 2023-06-28 DIAGNOSIS — M54.50 CHRONIC RIGHT-SIDED LOW BACK PAIN, UNSPECIFIED WHETHER SCIATICA PRESENT: ICD-10-CM

## 2023-06-28 DIAGNOSIS — G89.29 CHRONIC RIGHT-SIDED LOW BACK PAIN, UNSPECIFIED WHETHER SCIATICA PRESENT: ICD-10-CM

## 2023-06-28 PROCEDURE — 97110 THERAPEUTIC EXERCISES: CPT

## 2023-06-28 NOTE — OP THERAPY DAILY TREATMENT
"  Outpatient Physical Therapy  DAILY TREATMENT     [facility] Physical Therapy [address].  [address]  [place] [address]  Phone:  [phone]  Fax:  [phone]    Date: 06/28/2023    Patient: Erna Garcia  YOB: 1975  MRN: [id number]     Time Calculation    Start time: 1324  Stop time: 1349 Time Calculation (min): 25 minutes         Chief Complaint: Back Problem    Visit #: 3    SUBJECTIVE:  Pt was 10 minutes late to appointment, also has to leave early d/t family emergency.    Pt reports feeling a lot better today overall, has been very diligent with HEP. Reports pulling weeds over the weekend, resulting in soreness.    OBJECTIVE:  Current objective measures:   R ASIS anterior  Sacral L rotation          Therapeutic Exercises (CPT 08547):       Therapeutic Exercise Summary: -R Sidelying MET, 5x5\" hold  -Adductor bridge 5x10\" hold  -Pelvic foam roller series, x15' - SL BETHANY, ryan, w/ hands on ASIS for self-cueing    Not today:  -Self-MET, break the stick, not today  -Bridge, 2x fatigue  -Bird dogs, 5\" hold x 3'    HEP: Bridge, bird dogs, knee fall out      Time-based treatments/modalities:    Physical Therapy Timed Treatment Charges  Therapeutic exercise minutes (CPT 63394): 25 minutes          ASSESSMENT:   Response to treatment: Pt demo improved activity tolerance from previous visit. Continued progression of core strength/stability exercises. Demo good carryover with TA bracing, able to maintain neutral hips throughout exercises. Reported some R SI pain during bridge activities, low irritability. Pt is progressing at this time and appropriate to cont PT.    PLAN/RECOMMENDATIONS:   Plan for treatment: therapy treatment to continue next visit.  Planned interventions for next visit: continue with current treatment.         "

## 2023-07-05 ENCOUNTER — APPOINTMENT (OUTPATIENT)
Dept: PHYSICAL THERAPY | Facility: REHABILITATION | Age: 48
End: 2023-07-05
Attending: STUDENT IN AN ORGANIZED HEALTH CARE EDUCATION/TRAINING PROGRAM
Payer: COMMERCIAL

## 2023-07-11 ENCOUNTER — HOSPITAL ENCOUNTER (OUTPATIENT)
Dept: RADIOLOGY | Facility: MEDICAL CENTER | Age: 48
End: 2023-07-11
Attending: OBSTETRICS & GYNECOLOGY
Payer: COMMERCIAL

## 2023-07-11 DIAGNOSIS — N93.9 VAGINA BLEEDING: ICD-10-CM

## 2023-07-11 DIAGNOSIS — R10.2 PELVIC CRAMPING: ICD-10-CM

## 2023-07-11 PROCEDURE — 76830 TRANSVAGINAL US NON-OB: CPT

## 2023-07-17 ENCOUNTER — OFFICE VISIT (OUTPATIENT)
Dept: MEDICAL GROUP | Facility: CLINIC | Age: 48
End: 2023-07-17
Payer: COMMERCIAL

## 2023-07-17 VITALS
HEIGHT: 68 IN | OXYGEN SATURATION: 93 % | HEART RATE: 98 BPM | RESPIRATION RATE: 16 BRPM | BODY MASS INDEX: 26.37 KG/M2 | SYSTOLIC BLOOD PRESSURE: 116 MMHG | TEMPERATURE: 97.8 F | WEIGHT: 174 LBS | DIASTOLIC BLOOD PRESSURE: 80 MMHG

## 2023-07-17 DIAGNOSIS — G25.0 ESSENTIAL TREMOR: ICD-10-CM

## 2023-07-17 PROCEDURE — 3079F DIAST BP 80-89 MM HG: CPT

## 2023-07-17 PROCEDURE — 99213 OFFICE O/P EST LOW 20 MIN: CPT | Mod: GE

## 2023-07-17 PROCEDURE — 3074F SYST BP LT 130 MM HG: CPT

## 2023-07-17 RX ORDER — PROPRANOLOL HYDROCHLORIDE 20 MG/1
20 TABLET ORAL 3 TIMES DAILY
Qty: 90 TABLET | Refills: 3 | Status: SHIPPED | OUTPATIENT
Start: 2023-07-17

## 2023-07-17 ASSESSMENT — FIBROSIS 4 INDEX: FIB4 SCORE: 1.06

## 2023-07-19 ENCOUNTER — PHYSICAL THERAPY (OUTPATIENT)
Dept: PHYSICAL THERAPY | Facility: REHABILITATION | Age: 48
End: 2023-07-19
Attending: STUDENT IN AN ORGANIZED HEALTH CARE EDUCATION/TRAINING PROGRAM
Payer: COMMERCIAL

## 2023-07-19 DIAGNOSIS — G89.29 CHRONIC RIGHT-SIDED LOW BACK PAIN, UNSPECIFIED WHETHER SCIATICA PRESENT: ICD-10-CM

## 2023-07-19 DIAGNOSIS — M54.50 CHRONIC RIGHT-SIDED LOW BACK PAIN, UNSPECIFIED WHETHER SCIATICA PRESENT: ICD-10-CM

## 2023-07-19 PROCEDURE — 97110 THERAPEUTIC EXERCISES: CPT
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
## 2023-07-19 NOTE — OP THERAPY PROGRESS SUMMARY
"  Outpatient Physical Therapy  PROGRESS SUMMARY NOTE      [facility] Physical Therapy [address]  [address]  [place] [address]  Phone:  [phone]  Fax:  [phone]    Date of Visit: 07/19/2023    Patient: Erna Garcia  YOB: 1975  MRN: [id number]     Referring Provider: Grupo Garcia M.D.  [address] [place] [address]   Referring Diagnosis Low back pain, unspecified [M54.50];Other chronic pain [G89.29]     Visit Diagnoses     ICD-10-CM   1. Chronic right-sided low back pain, unspecified whether sciatica present  M54.50    G89.29       Rehab Potential: good    Progress Report Period: 5/22/23 to 7/19/23    Functional Assessment Used          Objective Findings and Assessment:   Patient progression towards goals: Pt demo overall improvements, but continues to demo difficulty standing and walking long periods of time. S/s consistent with SIJ involvement. Demo lumbar ROM WNL, demo poor extensor endurance. Pt demo positive response to self-MET exercises, with improved gait kinematics and reported decreased pain. The patient will continue to benefit from continued skilled therapy in order order to increase participation with daily activities. The pt states she understands and agrees to the POC.    Objective findings and assessment details: Lumbar ROM:  Flexion: WNL, painful at end-range  Ext: hypermobile, \"feels good\"  Lat flex: WNL bilat  Rotation: pain at end-ranges    Repeated flexion: decreases, improved ROM each rep  Repeated ext: decreased    PIT: decreased symptoms with OP, but not alleviated    Pelvis:  Positive compression, distraction, thigh thrust  R ASIS upslip    SB bridge isometric: 32 seconds    Goals:   Short Term Goals:   1. Pt will be independent with HEP 3-5x per week for improving strength, ROM and decreasing pain - partially met, progressing  2. Pt will demo ability to initiate core contraction mechanics for improved stability - met  3. Pt will report " ability to tolerate standing x 15 min with pain 3/10 or less in order to cook/wash dishes - partially met, progressing  4. Pt will demo proper squat mechanics in order to  objects from the ground - not assessed  Short term goal time span:  2-4 weeks      Long Term Goals:    1. Pt will demo lumbar ROM WNL in order to perform ADLs with pain 3/10 or less - met  2. Pt will demo bridge isometric 2 minutes or better for improved stabilty and decreased pain - not met  3. Pt will report ability to tolerate standing x 25 minutes with pain 3/10 or less in order to complete daily tasks - not met  4. Pt will report ability to tolerate sitting x 20 minutes with pain no greater than 3/10 in order to complete caregiver duties/drive car - partially met, progressing  Long term goal time span:  4-6 weeks    Plan:   Planned therapy interventions:  Therapeutic Exercise (CPT 38797) and Neuromuscular Re-education (CPT 26580)  Frequency:  2x week  Duration in visits:  8  Plan details:  3 units 61147, 1 unit 92306      Referring provider co-signature:  I have reviewed this plan of care and my co-signature certifies the need for services.     Certification Period: 07/19/2023 to 10/17/23    Physician Signature: ________________________________ Date: ______________

## 2023-07-19 NOTE — PROGRESS NOTES
"Subjective:     CC: Prescription refill.    HPI:   Erna is a 48-year-old female who presents today for prescription refill.  Patient would like a refill on her propranolol for her essential tremor.  No other concerns were mentioned during today's.    Current Outpatient Medications Ordered in Epic   Medication Sig Dispense Refill    propranolol (INDERAL) 20 MG Tab Take 1 Tablet by mouth 3 times a day. 90 Tablet 3    meloxicam (MOBIC) 7.5 MG Tab TAKE 1 TABLET BY MOUTH EVERY DAY 30 Tablet 0     No current Epic-ordered facility-administered medications on file.     ROS:  Gen: no fevers/chills, no changes in weight  Pulm: no sob, no cough  CV: no chest pain, no palpitations  GI: no nausea/vomiting, no diarrhea  Objective:   Exam:  /80 (BP Location: Left arm, Patient Position: Sitting, BP Cuff Size: Adult)   Pulse 98   Temp 36.6 °C (97.8 °F) (Temporal)   Resp 16   Ht 1.727 m (5' 8\")   Wt 78.9 kg (174 lb)   LMP 09/02/2012   SpO2 93%   BMI 26.46 kg/m²  Body mass index is 26.46 kg/m².    Gen: Alert and oriented, No apparent distress.  Anxious appearing  Neck: Neck is supple without lymphadenopathy.  Lungs: Normal effort, CTA bilaterally, no wheezes, rhonchi, or rales  CV: Regular rate and rhythm. No murmurs, rubs, or gallops.  Ext: No clubbing, cyanosis, edema.    Labs: No new labs reviewed or ordered during today's visit.  Assessment & Plan:     48 y.o. female with the following:    Problem List Items Addressed This Visit       Essential tremor     Patient has a chronic history of essential tremor, currently on propranolol 10 mg 3 times daily.tremors present in bilateral hands advised herself having difficulty holding beverages at times.  Her significant other passed recently and feels her tremor has been worsening since.  -Continue to monitor with clinic visit as needed basis.  -Continue propranolol 10 mg daily             Renay Gresham MD  PGY2 Resident   [facility], [facility]    "

## 2023-07-26 ENCOUNTER — PHYSICAL THERAPY (OUTPATIENT)
Dept: PHYSICAL THERAPY | Facility: REHABILITATION | Age: 48
End: 2023-07-26
Attending: STUDENT IN AN ORGANIZED HEALTH CARE EDUCATION/TRAINING PROGRAM
Payer: COMMERCIAL

## 2023-07-26 DIAGNOSIS — G89.29 CHRONIC RIGHT-SIDED LOW BACK PAIN, UNSPECIFIED WHETHER SCIATICA PRESENT: ICD-10-CM

## 2023-07-26 DIAGNOSIS — M54.50 CHRONIC RIGHT-SIDED LOW BACK PAIN, UNSPECIFIED WHETHER SCIATICA PRESENT: ICD-10-CM

## 2023-07-26 PROCEDURE — 97110 THERAPEUTIC EXERCISES: CPT

## 2023-07-26 NOTE — OP THERAPY DAILY TREATMENT
"  Outpatient Physical Therapy  DAILY TREATMENT     [facility] Therapy [address]  [address]  [place] [address]  Phone:  [phone]  Fax:  [phone]    Date: 07/26/2023    Patient: Erna Garcia  YOB: 1975  MRN: [id number]     Time Calculation    Start time: 1316  Stop time: 1359 Time Calculation (min): 43 minutes         Chief Complaint: Back Problem    Visit #: 5    SUBJECTIVE:  Pt reports having increased R SI pain today. Reports sleeping in awkward position. Has not completed HEP today.    OBJECTIVE:  Current objective measures:   L ASIS upslip, R anterior innominate           Therapeutic Exercises (CPT 79045):     1. Self-MET, break the stick, 2x5x5\" hold    2. R bridge w/ L hip flex, 5x5\" hold    3. SB bridge, to fatigue    4. SB superman, to fatigue    5. Bird dogs, 10\" hold to fatigue    6. Clams with OTB, 10x5\"hold    7. Pelvic foam roller series, not today    8. Prone press ups w/ PT assisted OP on sacrum, 10x5\" hold      Therapeutic Exercise Summary: Patient/family education conducted regarding pelvic assessment for self-MET, assessing leg length in supine, as well as innominate positioning. Determining self-MET positioning. Pt and partner gave verbal understanding.     HEP: Bridge, bird dogs, knee fall out      Time-based treatments/modalities:    Physical Therapy Timed Treatment Charges  Therapeutic exercise minutes (CPT 94675): 43 minutes      Pain rating (1-10) before treatment:  5  Pain rating (1-10) after treatment:  0    ASSESSMENT:   Response to treatment: Continued focus on METs for self-management. Pt tolerated exercises well, reported resolution of symptoms by end of session. Progressed strength and stability exercises, with focus on endurance/isometrics. Pt tolerated exercises well and without pain. Pt/family education provided regarding self-MET techniques for pain management. Pt is progressing at this time and appropriate to cont " PT.    PLAN/RECOMMENDATIONS:   Plan for treatment: therapy treatment to continue next visit.  Planned interventions for next visit: continue with current treatment.

## 2023-08-02 ENCOUNTER — PHYSICAL THERAPY (OUTPATIENT)
Dept: PHYSICAL THERAPY | Facility: REHABILITATION | Age: 48
End: 2023-08-02
Attending: STUDENT IN AN ORGANIZED HEALTH CARE EDUCATION/TRAINING PROGRAM
Payer: COMMERCIAL

## 2023-08-02 DIAGNOSIS — G89.29 CHRONIC RIGHT-SIDED LOW BACK PAIN, UNSPECIFIED WHETHER SCIATICA PRESENT: ICD-10-CM

## 2023-08-02 DIAGNOSIS — M54.50 CHRONIC RIGHT-SIDED LOW BACK PAIN, UNSPECIFIED WHETHER SCIATICA PRESENT: ICD-10-CM

## 2023-08-02 PROCEDURE — 97014 ELECTRIC STIMULATION THERAPY: CPT

## 2023-08-02 PROCEDURE — 97110 THERAPEUTIC EXERCISES: CPT

## 2023-08-02 NOTE — OP THERAPY DAILY TREATMENT
"  Outpatient Physical Therapy  DAILY TREATMENT     [facility] Physical [address]  [address]  [place] [address]  Phone:  [phone]  Fax:  [phone]    Date: 08/02/2023    Patient: Erna Garcia  YOB: 1975  MRN: [id number]     Time Calculation    Start time: 1533  Stop time: 1630 Time Calculation (min): 57 minutes         Chief Complaint: Back Problem    Visit #: 6    SUBJECTIVE:  Pt reports SI pain is overall improved today after seeing friend who a healer.    OBJECTIVE:  Current objective measures:           Therapeutic Exercises (CPT 68458):     1. Self-MET, break the stick, not today    2. SB bridge, 2x fatigue    3. Prone press ups w/ PT assisted OP on sacrum, 10x5\" hold    4. Qped self-MET w/ R ER, 5x5\" hold, demo improved SLS tolerance    5. Pelvic foam roller series w/ TABr, x10', KFO, marches    6. Slider disc posterior slide, x8 in tolerable ROM    7. Add/abd bridge, 10x5\" hold      Therapeutic Exercise Summary: HEP: Bridge, bird dogs, knee fall out    Therapeutic Treatments and Modalities:     1. E Stim Unattended (CPT 35242), IFC w/ MHP to low back, x15', in prone extension    Time-based treatments/modalities:    Physical Therapy Timed Treatment Charges  Therapeutic exercise minutes (CPT 02931): 40 minutes      ASSESSMENT:   Response to treatment: Continued focus on pelvic and lumbar stability exercise. Pt tolerated exercises well with improved flexion ROM. Continues to demo Donn's sign when returning to standing. Pt ed conducted regarding importance of HEP compliance in regard to prognosis. Will likely not have improvement if not completing stability exercises. Pt gave verbal understanding.    PLAN/RECOMMENDATIONS:   Plan for treatment: therapy treatment to continue next visit.  Planned interventions for next visit: continue with current treatment.         "

## 2023-08-09 ENCOUNTER — PHYSICAL THERAPY (OUTPATIENT)
Dept: PHYSICAL THERAPY | Facility: REHABILITATION | Age: 48
End: 2023-08-09
Attending: STUDENT IN AN ORGANIZED HEALTH CARE EDUCATION/TRAINING PROGRAM
Payer: COMMERCIAL

## 2023-08-09 DIAGNOSIS — M54.50 CHRONIC RIGHT-SIDED LOW BACK PAIN, UNSPECIFIED WHETHER SCIATICA PRESENT: ICD-10-CM

## 2023-08-09 DIAGNOSIS — G89.29 CHRONIC RIGHT-SIDED LOW BACK PAIN, UNSPECIFIED WHETHER SCIATICA PRESENT: ICD-10-CM

## 2023-08-09 PROCEDURE — 97110 THERAPEUTIC EXERCISES: CPT

## 2023-08-09 NOTE — OP THERAPY DAILY TREATMENT
"  Outpatient Physical Therapy  DAILY TREATMENT     [facility] Physical [address].  [address]  [place] [address]  Phone:  [phone]  Fax:  [phone]    Date: 08/09/2023    Patient: Erna Garcia  YOB: 1975  MRN: [id number]     Time Calculation    Start time: 1445  Stop time: 1515 Time Calculation (min): 30 minutes         Chief Complaint: Back Problem    Visit #: 7    SUBJECTIVE:  Pt reports having continue R side back pain that is worse with standing from bending fwd. Reports symptoms worsen throughout the fay. 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 Reports having to leave 15 minutes early for DMV appointment.    OBJECTIVE:  Current objective measures:     Positive PET, PIT testing      Therapeutic Exercises (CPT 03648):     1. Core activation progression with pressure cuff for external feedback, x15', KFO, marches, SB curl    2. Ramírez curls at wall, x10    3. Lifting mechanics floor to waist 10#, x5, w/ focus on proper mechanics, core activation    4. Multifidi press against wall with self-palpation for feedback, 6x5\" hold      Therapeutic Exercise Summary: HEP: Bridge, bird dogs, knee fall out      Time-based treatments/modalities:    Physical Therapy Timed Treatment Charges  Therapeutic exercise minutes (CPT 72558): 30 minutes        ASSESSMENT:   Response to treatment: Pt demo positive provocative testing for motor control/movement coordination deficits contributing to back pain. Pt ed again conducted regarding the importance of HEP adherence with focus on core stability exercises in regard to prognosis. Pt gave verbal understanding. Progressed core stability exercises with use of pressure cuff for external feedback. Pt demo difficulty maintaining position/engagement with increased complexity of exercises into flexion positions. Pt demo overall poor endurance, requiring rest breaks. Reviewed HEP, added core stability focus to exercises. "     PLAN/RECOMMENDATIONS:   Plan for treatment: therapy treatment to continue next visit.  Planned interventions for next visit: continue with current treatment.

## 2023-08-22 ENCOUNTER — TELEPHONE (OUTPATIENT)
Dept: PHYSICAL THERAPY | Facility: REHABILITATION | Age: 48
End: 2023-08-22
Payer: COMMERCIAL

## 2023-08-22 NOTE — OP THERAPY DISCHARGE SUMMARY
Outpatient Physical Therapy  DISCHARGE SUMMARY NOTE      [facility] Physical Therapy [address]  [address]  [place] [address]  Phone:  [phone]  Fax:  [phone]    Date of Visit: 08/22/2023    Patient: Erna Garcia  YOB: 1975  MRN: [id number]     Referring Provider: Grupo Garcia M.D.  [address] [place] [address]   Referring Diagnosis Chronic bilateral low back pain without sciatica [M54.50, G89.29]         Functional Assessment Used        Your patient is being discharged from Physical Therapy with the following comments:     Comments:  Pt has attended 7 visits from 5/22/23 to 8/9/23. Pt called to cancel all remaining appointments d/t broken hip. Unable to assess goals.       Clint Harris, PT, DPT    Date: 8/22/2023

## 2023-08-23 ENCOUNTER — APPOINTMENT (OUTPATIENT)
Dept: PHYSICAL THERAPY | Facility: REHABILITATION | Age: 48
End: 2023-08-23
Attending: STUDENT IN AN ORGANIZED HEALTH CARE EDUCATION/TRAINING PROGRAM
Payer: COMMERCIAL

## 2023-09-29 ENCOUNTER — APPOINTMENT (OUTPATIENT)
Dept: PHYSICAL THERAPY | Facility: REHABILITATION | Age: 48
End: 2023-09-29
Attending: STUDENT IN AN ORGANIZED HEALTH CARE EDUCATION/TRAINING PROGRAM
Payer: COMMERCIAL

## 2023-10-02 ENCOUNTER — APPOINTMENT (OUTPATIENT)
Dept: PHYSICAL THERAPY | Facility: REHABILITATION | Age: 48
End: 2023-10-02
Attending: STUDENT IN AN ORGANIZED HEALTH CARE EDUCATION/TRAINING PROGRAM
Payer: COMMERCIAL

## 2023-10-09 ENCOUNTER — APPOINTMENT (OUTPATIENT)
Dept: PHYSICAL THERAPY | Facility: REHABILITATION | Age: 48
End: 2023-10-09
Attending: STUDENT IN AN ORGANIZED HEALTH CARE EDUCATION/TRAINING PROGRAM
Payer: COMMERCIAL

## 2023-10-16 ENCOUNTER — APPOINTMENT (OUTPATIENT)
Dept: PHYSICAL THERAPY | Facility: REHABILITATION | Age: 48
End: 2023-10-16
Attending: STUDENT IN AN ORGANIZED HEALTH CARE EDUCATION/TRAINING PROGRAM
Payer: COMMERCIAL

## 2023-10-23 ENCOUNTER — APPOINTMENT (OUTPATIENT)
Dept: PHYSICAL THERAPY | Facility: REHABILITATION | Age: 48
End: 2023-10-23
Attending: STUDENT IN AN ORGANIZED HEALTH CARE EDUCATION/TRAINING PROGRAM
Payer: COMMERCIAL

## 2023-10-30 ENCOUNTER — APPOINTMENT (OUTPATIENT)
Dept: PHYSICAL THERAPY | Facility: REHABILITATION | Age: 48
End: 2023-10-30
Attending: STUDENT IN AN ORGANIZED HEALTH CARE EDUCATION/TRAINING PROGRAM
Payer: COMMERCIAL

## 2023-11-22 ENCOUNTER — APPOINTMENT (OUTPATIENT)
Dept: MEDICAL GROUP | Facility: CLINIC | Age: 48
End: 2023-11-22
Payer: COMMERCIAL

## 2024-02-12 ENCOUNTER — OFFICE VISIT (OUTPATIENT)
Dept: MEDICAL GROUP | Facility: CLINIC | Age: 49
End: 2024-02-12
Payer: COMMERCIAL

## 2024-02-12 VITALS
WEIGHT: 194 LBS | SYSTOLIC BLOOD PRESSURE: 102 MMHG | TEMPERATURE: 98.1 F | DIASTOLIC BLOOD PRESSURE: 70 MMHG | BODY MASS INDEX: 29.4 KG/M2 | HEIGHT: 68 IN | RESPIRATION RATE: 16 BRPM

## 2024-02-12 DIAGNOSIS — J34.89 SINUS PRESSURE: Primary | ICD-10-CM

## 2024-02-12 PROCEDURE — 3074F SYST BP LT 130 MM HG: CPT | Performed by: FAMILY MEDICINE

## 2024-02-12 PROCEDURE — 99214 OFFICE O/P EST MOD 30 MIN: CPT | Performed by: FAMILY MEDICINE

## 2024-02-12 PROCEDURE — 3078F DIAST BP <80 MM HG: CPT | Performed by: FAMILY MEDICINE

## 2024-02-12 RX ORDER — AMOXICILLIN AND CLAVULANATE POTASSIUM 875; 125 MG/1; MG/1
1 TABLET, FILM COATED ORAL 2 TIMES DAILY
Qty: 14 TABLET | Refills: 0 | Status: SHIPPED | OUTPATIENT
Start: 2024-02-12 | End: 2024-02-19

## 2024-02-12 ASSESSMENT — ENCOUNTER SYMPTOMS
SINUS PAIN: 1
SHORTNESS OF BREATH: 0
FEVER: 0

## 2024-02-12 ASSESSMENT — FIBROSIS 4 INDEX: FIB4 SCORE: 1.06

## 2024-02-12 ASSESSMENT — PATIENT HEALTH QUESTIONNAIRE - PHQ9: CLINICAL INTERPRETATION OF PHQ2 SCORE: 0

## 2024-02-12 NOTE — PROGRESS NOTES
"Subjective:     Chief Complaint   Patient presents with    Follow-Up     Here to follow up from [facility] ER for abscessed tooth       HPI: Erna is a 48 y.o. female who presents today for the following problems:    Here today to discuss right-sided sinus pressure.  Was seen in the ER was treated with amoxicillin and still had symptoms and was trialed on clindamycin.  Still having symptoms despite being on clindamycin.  Denies any diarrhea at this time.  He is having some probiotics.  Would like to make sure that her symptoms are improved by the time she goes for tooth removal later this month.  Was seen by her dentist recently and was assured that this was not a tooth abscess but more sinus situation.    No problems updated.    Current Outpatient Medications   Medication Sig Dispense Refill    amoxicillin-clavulanate (AUGMENTIN) 875-125 MG Tab Take 1 Tablet by mouth 2 times a day for 7 days. 14 Tablet 0    propranolol (INDERAL) 20 MG Tab Take 1 Tablet by mouth 3 times a day. 90 Tablet 3    meloxicam (MOBIC) 7.5 MG Tab TAKE 1 TABLET BY MOUTH EVERY DAY 30 Tablet 0     No current facility-administered medications for this visit.       Social History     Tobacco Use    Smoking status: Every Day     Current packs/day: 1.00     Average packs/day: 1 pack/day for 28.0 years (28.0 ttl pk-yrs)     Types: Cigarettes    Smokeless tobacco: Never   Substance Use Topics    Alcohol use: Yes     Comment: socially    Drug use: Yes     Types: Inhaled, Methamphetamines     Comment: meth pt states last used 2 yrs ago        Review of Systems   Constitutional:  Negative for fever.   HENT:  Positive for sinus pain.    Respiratory:  Negative for shortness of breath.        Objective:     Vitals:    02/12/24 0914   BP: 102/70   BP Location: Left arm   Patient Position: Sitting   BP Cuff Size: Adult   Resp: 16   Temp: 36.7 °C (98.1 °F)   TempSrc: Temporal   Weight: 88 kg (194 lb)   Height: 1.727 m (5' 8\")     Body mass index is 29.5 " kg/m².     Physical Exam:  Gen: Well developed, well nourished in no acute distress.   Skin: Pink, warm, and dry  HEENT: conjunctiva non-injected; right-sided maxillary and frontal sinus pressure tenderness to palpation; no tenderness to palpation on the left maxillary or frontal sinus.  Oropharyngeal exam does not demonstrate any abscesses, bleeding, discharge.  No dental caries appreciated.  Tympanic membrane's are bilaterally pearly gray with adequate landmarks and no erythema, bulging, discharge appreciated no tympanic membrane respiratory.  Auditory canals are patent without erythema or discharge.  Alert and oriented Eye contact is good, speech goal directed, affect calm  Gait: not antalgic    Assessment & Plan:   No problem-specific Assessment & Plan notes found for this encounter.    1. Sinus pressure  Patient symptoms appear to be secondary to bacterial sinusitis.  Given concern for possible diarrheal or C. difficile diarrhea as a result of clindamycin, counseled patient to discontinue use of clindamycin and we will switch to Augmentin to provide adequate treatment for sinus infection.  Patient verbalized understanding and wishes to proceed with this plan.  Will follow-up in a week's time to ensure improvement.  Continue probiotic use.  - amoxicillin-clavulanate (AUGMENTIN) 875-125 MG Tab; Take 1 Tablet by mouth 2 times a day for 7 days.  Dispense: 14 Tablet; Refill: 0     Patient verbalizes understanding and agreement with assessment and plan.    Followup: Return if symptoms worsen or fail to improve.    Momo Mcdaniel M.D.    Please note that this dictation was created using voice recognition software. I have made every reasonable attempt to correct obvious errors, but I expect that there are errors of grammar and possibly content that I did not discover before finalizing the note.

## 2024-02-15 ENCOUNTER — APPOINTMENT (OUTPATIENT)
Dept: MEDICAL GROUP | Facility: CLINIC | Age: 49
End: 2024-02-15
Payer: COMMERCIAL

## 2024-02-20 ENCOUNTER — OFFICE VISIT (OUTPATIENT)
Dept: MEDICAL GROUP | Facility: CLINIC | Age: 49
End: 2024-02-20
Payer: COMMERCIAL

## 2024-02-20 VITALS
HEIGHT: 68 IN | DIASTOLIC BLOOD PRESSURE: 56 MMHG | HEART RATE: 78 BPM | WEIGHT: 193 LBS | RESPIRATION RATE: 16 BRPM | SYSTOLIC BLOOD PRESSURE: 81 MMHG | BODY MASS INDEX: 29.25 KG/M2 | OXYGEN SATURATION: 93 %

## 2024-02-20 DIAGNOSIS — Z72.0 TOBACCO ABUSE: ICD-10-CM

## 2024-02-20 DIAGNOSIS — G43.111 INTRACTABLE MIGRAINE WITH AURA WITH STATUS MIGRAINOSUS: ICD-10-CM

## 2024-02-20 PROBLEM — G43.909 MIGRAINE: Status: ACTIVE | Noted: 2024-02-20

## 2024-02-20 PROCEDURE — 99214 OFFICE O/P EST MOD 30 MIN: CPT | Mod: GC

## 2024-02-20 PROCEDURE — 3074F SYST BP LT 130 MM HG: CPT

## 2024-02-20 PROCEDURE — 3078F DIAST BP <80 MM HG: CPT

## 2024-02-20 RX ORDER — NICOTINE 21 MG/24HR
1 PATCH, TRANSDERMAL 24 HOURS TRANSDERMAL EVERY 24 HOURS
Qty: 42 PATCH | Refills: 0 | Status: SHIPPED | OUTPATIENT
Start: 2024-02-20 | End: 2024-04-02

## 2024-02-20 RX ORDER — ZOLMITRIPTAN 5 MG/1
5 TABLET, FILM COATED ORAL 2 TIMES DAILY PRN
Qty: 15 TABLET | Refills: 0 | Status: SHIPPED | OUTPATIENT
Start: 2024-02-20

## 2024-02-20 RX ORDER — METOCLOPRAMIDE 5 MG/1
5 TABLET ORAL 3 TIMES DAILY PRN
Qty: 15 TABLET | Refills: 0 | Status: SHIPPED | OUTPATIENT
Start: 2024-02-20

## 2024-02-20 ASSESSMENT — FIBROSIS 4 INDEX: FIB4 SCORE: 1.06

## 2024-02-20 NOTE — PROGRESS NOTES
"This note is formatted in an APSO format, for additional subjective and objective evaluation please scroll to the bottom of the note.    CC:  Chief Complaint   Patient presents with    Follow-Up     Here for f/u of sinus pressure, but also has headache.    30 years of migraines. There has been a long gap but it feels like a migraine now. Pain on the back of her head bilaterally, throbbing \"like someone is hitting me over the head with a frying pan\", light/sound sensitivity, visual aura. 3rd day of the headache. Unresponsive to ibuprofen. +Nausea and vomiting.    Assessment/Plan:  Problem List Items Addressed This Visit       Tobacco abuse     Wants to try patch, will try after migraine is treated.  - 21mg patch 6 weeks  - f/u in 4-6 weeks for next step  - cessation resources given         Relevant Medications    nicotine (NICODERM) 21 MG/24HR PATCH 24 HR    Migraine     Currently for 3 days. Not responsive to imitrix in the past.  - will try zolmitriptan + ibuprofen + reglan  - RTC in 4-6 weeks  - Conservative measures discussed/lifestyle measures         Relevant Medications    zolmitriptan (ZOMIG) 5 MG tablet      Orders Placed This Encounter    zolmitriptan (ZOMIG) 5 MG tablet    metoclopramide (REGLAN) 5 MG tablet    nicotine (NICODERM) 21 MG/24HR PATCH 24 HR       Return in about 5 weeks (around 3/26/2024).    HISTORY OF PRESENT ILLNESS: Patient is a 48 y.o. female established patient who presents today with:    Problem   Migraine    Hx of migraines 30 years, sound/light sensitive, n/v.     Tobacco Abuse       1. Intractable migraine with aura with status migrainosus        2. Tobacco abuse            Patient Active Problem List    Diagnosis Date Noted    Migraine 02/20/2024    Chronic bilateral low back pain without sciatica 04/17/2023    Essential tremor 04/17/2023    Osteoarthritis of metacarpophalangeal (MCP) joint of left thumb 02/28/2023    Grieving 02/16/2023    BMI 27.0-27.9,adult 02/16/2023    Syncope " "10/18/2019    Acute metabolic encephalopathy 10/18/2019    Leukocytosis 10/18/2019    Right ankle pain 10/18/2019    Intractable migraine without aura and with status migrainosus 01/24/2019    Nausea 01/24/2019    Tobacco abuse 01/24/2019      Allergies:Aspirin and Phenergan [promethazine]    Current Outpatient Medications   Medication Sig Dispense Refill    zolmitriptan (ZOMIG) 5 MG tablet Take 1 Tablet by mouth 2 times a day as needed for Migraine. 15 Tablet 0    metoclopramide (REGLAN) 5 MG tablet Take 1 Tablet by mouth 3 times a day as needed (migraine or nausea). 15 Tablet 0    nicotine (NICODERM) 21 MG/24HR PATCH 24 HR Place 1 Patch on the skin every 24 hours for 42 days. Contact PCP for next dose 42 Patch 0    propranolol (INDERAL) 20 MG Tab Take 1 Tablet by mouth 3 times a day. 90 Tablet 3    meloxicam (MOBIC) 7.5 MG Tab TAKE 1 TABLET BY MOUTH EVERY DAY 30 Tablet 0     No current facility-administered medications for this visit.       Social History     Tobacco Use    Smoking status: Every Day     Current packs/day: 1.00     Average packs/day: 1 pack/day for 28.0 years (28.0 ttl pk-yrs)     Types: Cigarettes    Smokeless tobacco: Never   Substance Use Topics    Alcohol use: Yes     Comment: socially    Drug use: Yes     Types: Inhaled, Methamphetamines     Comment: meth pt states last used 2 yrs ago     Social History     Social History Narrative    ** Merged History Encounter **         ** Merged History Encounter **            Family History   Problem Relation Age of Onset    Heart Disease Mother     Asthma Mother     Cystic Fibrosis Mother     Heart Disease Maternal Aunt     Cancer Maternal Grandmother     Heart Disease Maternal Grandfather        Exam:    BP (!) 81/56 (BP Location: Right arm, Patient Position: Sitting, BP Cuff Size: Adult long)   Pulse 78   Resp 16   Ht 1.727 m (5' 8\")   Wt 87.5 kg (193 lb)   LMP 09/02/2012   SpO2 93%   BMI 29.35 kg/m²  Body mass index is 29.35 kg/m².    Gen: Well " appearing. No apparent distress. Well developed. Laying on exam table with lights off.  HEENT: NCAT, MMM  Neck: Supple, FROM  Chest: No deformities, Equal chest expansion  Lungs: Normal effort, CTA bilaterally.  CV: Regular rate and rhythm.  Abd: Non-distended.   Ext: No cyanosis. No edema.  Skin: Warm/dry. No visible rashes.  Neuro: Non-focal. A&Ox4.  Psych: Normal behavior, normal affect      Tino Ayers MD  [facility] Resident

## 2024-02-20 NOTE — ASSESSMENT & PLAN NOTE
Currently for 3 days. Not responsive to imitrix in the past.  - will try zolmitriptan + ibuprofen + reglan  - RTC in 4-6 weeks  - Conservative measures discussed/lifestyle measures

## 2024-02-20 NOTE — ASSESSMENT & PLAN NOTE
Wants to try patch, will try after migraine is treated.  - 21mg patch 6 weeks  - f/u in 4-6 weeks for next step  - cessation resources given

## 2024-05-03 ENCOUNTER — RESEARCH ENCOUNTER (OUTPATIENT)
Dept: RESEARCH | Facility: MEDICAL CENTER | Age: 49
End: 2024-05-03

## 2024-05-03 ENCOUNTER — OFFICE VISIT (OUTPATIENT)
Dept: MEDICAL GROUP | Facility: CLINIC | Age: 49
End: 2024-05-03
Payer: COMMERCIAL

## 2024-05-03 VITALS
HEIGHT: 68 IN | DIASTOLIC BLOOD PRESSURE: 66 MMHG | BODY MASS INDEX: 28.19 KG/M2 | TEMPERATURE: 97.6 F | SYSTOLIC BLOOD PRESSURE: 101 MMHG | HEART RATE: 90 BPM | OXYGEN SATURATION: 90 % | WEIGHT: 186 LBS

## 2024-05-03 DIAGNOSIS — Z00.6 RESEARCH STUDY PATIENT: ICD-10-CM

## 2024-05-03 DIAGNOSIS — Z13.79 GENETIC SCREENING: ICD-10-CM

## 2024-05-03 DIAGNOSIS — G25.81 RESTLESS LEG SYNDROME: ICD-10-CM

## 2024-05-03 DIAGNOSIS — G25.71 AKATHISIA: ICD-10-CM

## 2024-05-03 PROCEDURE — 99213 OFFICE O/P EST LOW 20 MIN: CPT | Mod: GE

## 2024-05-03 RX ORDER — FERROUS SULFATE 325(65) MG
325 TABLET ORAL DAILY
Qty: 90 TABLET | Refills: 0 | Status: SHIPPED | OUTPATIENT
Start: 2024-05-03 | End: 2024-05-31 | Stop reason: CLARIF

## 2024-05-03 RX ORDER — GABAPENTIN 300 MG/1
300 CAPSULE ORAL
Qty: 90 CAPSULE | Refills: 0 | Status: SHIPPED | OUTPATIENT
Start: 2024-05-03

## 2024-05-03 NOTE — RESEARCH NOTE
Patient has been referred by Ann Joseph. Sent initial referral follow-up message with instructions to locate and sign consent form(s). The following consent form(s) have been pushed to the patient's MyChart: TANJA/JONA

## 2024-05-06 PROBLEM — G25.71 AKATHISIA: Status: ACTIVE | Noted: 2024-05-06

## 2024-05-06 PROBLEM — G25.81 RESTLESS LEG SYNDROME: Status: ACTIVE | Noted: 2024-05-06

## 2024-05-06 NOTE — PROGRESS NOTES
"Subjective:     CC: Restless legs    HPI:   Erna presents today with    Problem   Restless Leg Syndrome    - Started 3 months ago when patient stopped opioids 3 months ago.  - Previously used fentanyl a few years back. Has used vicodin q4-6 hrs since.  - Since quitting has been unable to keep legs still. Associated with need/desire to keep moving and restlessness       Akathisia       Current Outpatient Medications Ordered in Epic   Medication Sig Dispense Refill    gabapentin (NEURONTIN) 300 MG Cap Take 1 Capsule by mouth at bedtime. 90 Capsule 0    ferrous sulfate 325 (65 Fe) MG tablet Take 1 Tablet by mouth every day. 90 Tablet 0    zolmitriptan (ZOMIG) 5 MG tablet Take 1 Tablet by mouth 2 times a day as needed for Migraine. 15 Tablet 0    metoclopramide (REGLAN) 5 MG tablet Take 1 Tablet by mouth 3 times a day as needed (migraine or nausea). 15 Tablet 0    propranolol (INDERAL) 20 MG Tab Take 1 Tablet by mouth 3 times a day. 90 Tablet 3    meloxicam (MOBIC) 7.5 MG Tab TAKE 1 TABLET BY MOUTH EVERY DAY 30 Tablet 0     No current Epic-ordered facility-administered medications on file.       Objective:     Exam:  /66   Pulse 90   Temp 36.4 °C (97.6 °F)   Ht 1.727 m (5' 8\")   Wt 84.4 kg (186 lb)   LMP 09/02/2012   SpO2 90%   BMI 28.28 kg/m²  Body mass index is 28.28 kg/m².    Gen: Alert and oriented, No apparent distress.  HEENT: PERRL, EOMI, NCAT, uvula midline, no exudates  Neck: Neck is supple without lymphadenopathy.  Lungs: Normal effort, CTA bilaterally, no wheezes, rhonchi, or rales  CV: Regular rate and rhythm. No murmurs, rubs, or gallops.  Abd:  Soft, Non-distended, non-tender  Ext: No clubbing, cyanosis, edema.  Skin: No rashes, no erythema    Labs: Reviewed old labs with patient    Assessment & Plan:     49 y.o. female with the following:     Problem List Items Addressed This Visit       Restless leg syndrome     Likely related to opioid use. Restless leg/akathisia started after cessation. Pt " is not interested in suboxone, may be interested in methadone but would like to resolve this issue without any opioids ideally.    Will check CBC, ferritin. Will also check CMP and TSH  Start oral iron preemptively  Trial Gabapentin qhs  Will consider ropinirole if gabapentin ineffective  Follow up labs. Patient to return to clinic to discuss further.           Relevant Orders    Comp Metabolic Panel    CBC WITH DIFFERENTIAL    FERRITIN    TSH WITH REFLEX TO FT4    Akathisia         No follow-ups on file.

## 2024-05-06 NOTE — ASSESSMENT & PLAN NOTE
Likely related to opioid use. Restless leg/akathisia started after cessation. Pt is not interested in suboxone, may be interested in methadone but would like to resolve this issue without any opioids ideally.    Will check CBC, ferritin. Will also check CMP and TSH  Start oral iron preemptively  Trial Gabapentin qhs  Will consider ropinirole if gabapentin ineffective  Follow up labs. Patient to return to clinic to discuss further.

## 2024-05-20 ENCOUNTER — OFFICE VISIT (OUTPATIENT)
Dept: MEDICAL GROUP | Facility: CLINIC | Age: 49
End: 2024-05-20
Payer: COMMERCIAL

## 2024-05-20 VITALS
HEIGHT: 68 IN | BODY MASS INDEX: 28.34 KG/M2 | DIASTOLIC BLOOD PRESSURE: 63 MMHG | HEART RATE: 75 BPM | OXYGEN SATURATION: 98 % | SYSTOLIC BLOOD PRESSURE: 88 MMHG | TEMPERATURE: 97.8 F | WEIGHT: 187 LBS

## 2024-05-20 DIAGNOSIS — G25.81 RESTLESS LEG SYNDROME: ICD-10-CM

## 2024-05-20 PROCEDURE — 99213 OFFICE O/P EST LOW 20 MIN: CPT | Mod: GE

## 2024-05-20 RX ORDER — ROPINIROLE 0.25 MG/1
0.25 TABLET, FILM COATED ORAL NIGHTLY
Qty: 30 TABLET | Refills: 0 | Status: SHIPPED | OUTPATIENT
Start: 2024-05-20 | End: 2024-06-19

## 2024-05-21 NOTE — PROGRESS NOTES
SUBJECTIVE:     CC:   Chief Complaint   Patient presents with    Annual Exam     Restless leg syndrome/no improvement       HPI:   Erna presents today following up on her restless leg syndrome. She was seen in the clinic two weeks ago and started on trial of gabapentin and iron supplements preemptively pending her ferritin lab result, which she states she is yet to get done.  She reports that gabapentin is not helping her symptoms.     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    Chronic bilateral low back pain without sciatica    Essential tremor    Migraine    Restless leg syndrome    Akathisia       Surgical History:  Past Surgical History:   Procedure Laterality Date    APPENDECTOMY      GYN SURGERY      lap fibroid removal  2009    HYSTERECTOMY LAPAROSCOPY      ORIF, WRIST Left     OTHER ORTHOPEDIC SURGERY      left wrist surgery    TUBAL LIGATION         Family History:  Family History   Problem Relation Age of Onset    Heart Disease Mother     Asthma Mother     Cystic Fibrosis Mother     Heart Disease Maternal Aunt     Cancer Maternal Grandmother     Heart Disease Maternal Grandfather        Social History:  Social History     Tobacco Use    Smoking status: Every Day     Current packs/day: 1.00     Average packs/day: 1 pack/day for 28.0 years (28.0 ttl pk-yrs)     Types: Cigarettes    Smokeless tobacco: Never   Vaping Use    Vaping status: Never Used  "  Substance Use Topics    Alcohol use: Yes     Comment: socially    Drug use: Yes     Types: Marijuana     Comment: meth pt states last used 2 yrs ago       Medications:  Current Outpatient Medications on File Prior to Visit   Medication Sig Dispense Refill    gabapentin (NEURONTIN) 300 MG Cap Take 1 Capsule by mouth at bedtime. 90 Capsule 0    propranolol (INDERAL) 20 MG Tab Take 1 Tablet by mouth 3 times a day. 90 Tablet 3    ferrous sulfate 325 (65 Fe) MG tablet Take 1 Tablet by mouth every day. (Patient not taking: Reported on 5/20/2024) 90 Tablet 0    zolmitriptan (ZOMIG) 5 MG tablet Take 1 Tablet by mouth 2 times a day as needed for Migraine. (Patient not taking: Reported on 5/20/2024) 15 Tablet 0    metoclopramide (REGLAN) 5 MG tablet Take 1 Tablet by mouth 3 times a day as needed (migraine or nausea). (Patient not taking: Reported on 5/20/2024) 15 Tablet 0    meloxicam (MOBIC) 7.5 MG Tab TAKE 1 TABLET BY MOUTH EVERY DAY 30 Tablet 0     No current facility-administered medications on file prior to visit.       Allergies   Allergen Reactions    Aspirin      Swelling     Phenergan [Promethazine] Anxiety         ROS:   Gen: no fevers/chills, no changes in weight  Pulm: no sob, no cough  CV: no chest pain, no palpitations  GI: no nausea/vomiting, no diarrhea  MSk: no myalgias  Skin: no rash  Neuro: no headaches, no numbness/tingling      OBJECTIVE:     Exam:  BP (!) 88/63 (BP Location: Left arm, Patient Position: Sitting)   Pulse 75   Temp 36.6 °C (97.8 °F) (Temporal)   Ht 1.727 m (5' 8\")   Wt 84.8 kg (187 lb)   LMP 09/02/2012   SpO2 98%   BMI 28.43 kg/m²  Body mass index is 28.43 kg/m².    Gen: Alert and oriented, No apparent distress.  Head:  NCAT, EOMI, sclera clear without discharge  Lungs: Normal effort, CTA bilaterally, no wheezes, rhonchi, or rales  CV: Regular rate and rhythm. No murmurs, rubs, or gallops.  Abd:   Non-distended, soft  Ext: No clubbing, cyanosis, edema.  MSK: Unassisted gait  Derm: " No lesions on exposed skin  Psych: normal mood and affect      ASSESSMENT & PLAN:     49 y.o. female with the following -    Problem List Items Addressed This Visit       Restless leg syndrome  Pending patient's ferritin lab result, we will add ropinirole.  - Follow up as needed.    Relevant Medications    ROPINIRole (REQUIP) 0.25 MG Tab       Christiano Calderón, PGY-2  [facility]

## 2024-05-29 ENCOUNTER — HOSPITAL ENCOUNTER (OUTPATIENT)
Dept: LAB | Facility: MEDICAL CENTER | Age: 49
End: 2024-05-29
Payer: COMMERCIAL

## 2024-05-29 DIAGNOSIS — G25.81 RESTLESS LEG SYNDROME: ICD-10-CM

## 2024-05-29 LAB
ALBUMIN SERPL BCP-MCNC: 4 G/DL (ref 3.2–4.9)
ALBUMIN/GLOB SERPL: 1.5 G/DL
ALP SERPL-CCNC: 90 U/L (ref 30–99)
ALT SERPL-CCNC: 5 U/L (ref 2–50)
ANION GAP SERPL CALC-SCNC: 13 MMOL/L (ref 7–16)
AST SERPL-CCNC: 19 U/L (ref 12–45)
BASOPHILS # BLD AUTO: 0.9 % (ref 0–1.8)
BASOPHILS # BLD: 0.09 K/UL (ref 0–0.12)
BILIRUB SERPL-MCNC: 0.2 MG/DL (ref 0.1–1.5)
BUN SERPL-MCNC: 17 MG/DL (ref 8–22)
CALCIUM ALBUM COR SERPL-MCNC: 9.2 MG/DL (ref 8.5–10.5)
CALCIUM SERPL-MCNC: 9.2 MG/DL (ref 8.5–10.5)
CHLORIDE SERPL-SCNC: 103 MMOL/L (ref 96–112)
CO2 SERPL-SCNC: 24 MMOL/L (ref 20–33)
CREAT SERPL-MCNC: 0.92 MG/DL (ref 0.5–1.4)
EOSINOPHIL # BLD AUTO: 1.91 K/UL (ref 0–0.51)
EOSINOPHIL NFR BLD: 20.1 % (ref 0–6.9)
ERYTHROCYTE [DISTWIDTH] IN BLOOD BY AUTOMATED COUNT: 43.8 FL (ref 35.9–50)
FERRITIN SERPL-MCNC: 81.9 NG/ML (ref 10–291)
GFR SERPLBLD CREATININE-BSD FMLA CKD-EPI: 77 ML/MIN/1.73 M 2
GLOBULIN SER CALC-MCNC: 2.7 G/DL (ref 1.9–3.5)
GLUCOSE SERPL-MCNC: 81 MG/DL (ref 65–99)
HCT VFR BLD AUTO: 38.9 % (ref 37–47)
HGB BLD-MCNC: 13.4 G/DL (ref 12–16)
IMM GRANULOCYTES # BLD AUTO: 0.03 K/UL (ref 0–0.11)
IMM GRANULOCYTES NFR BLD AUTO: 0.3 % (ref 0–0.9)
LYMPHOCYTES # BLD AUTO: 3.46 K/UL (ref 1–4.8)
LYMPHOCYTES NFR BLD: 36.4 % (ref 22–41)
MCH RBC QN AUTO: 30.7 PG (ref 27–33)
MCHC RBC AUTO-ENTMCNC: 34.4 G/DL (ref 32.2–35.5)
MCV RBC AUTO: 89 FL (ref 81.4–97.8)
MONOCYTES # BLD AUTO: 0.71 K/UL (ref 0–0.85)
MONOCYTES NFR BLD AUTO: 7.5 % (ref 0–13.4)
NEUTROPHILS # BLD AUTO: 3.3 K/UL (ref 1.82–7.42)
NEUTROPHILS NFR BLD: 34.8 % (ref 44–72)
NRBC # BLD AUTO: 0 K/UL
NRBC BLD-RTO: 0 /100 WBC (ref 0–0.2)
PLATELET # BLD AUTO: 335 K/UL (ref 164–446)
PMV BLD AUTO: 9 FL (ref 9–12.9)
POTASSIUM SERPL-SCNC: 3.8 MMOL/L (ref 3.6–5.5)
PROT SERPL-MCNC: 6.7 G/DL (ref 6–8.2)
RBC # BLD AUTO: 4.37 M/UL (ref 4.2–5.4)
SODIUM SERPL-SCNC: 140 MMOL/L (ref 135–145)
TSH SERPL DL<=0.005 MIU/L-ACNC: 4.68 UIU/ML (ref 0.38–5.33)
WBC # BLD AUTO: 9.5 K/UL (ref 4.8–10.8)

## 2024-05-29 PROCEDURE — 80053 COMPREHEN METABOLIC PANEL: CPT

## 2024-05-29 PROCEDURE — 85025 COMPLETE CBC W/AUTO DIFF WBC: CPT

## 2024-05-29 PROCEDURE — 36415 COLL VENOUS BLD VENIPUNCTURE: CPT

## 2024-05-29 PROCEDURE — 84443 ASSAY THYROID STIM HORMONE: CPT

## 2024-05-29 PROCEDURE — 82728 ASSAY OF FERRITIN: CPT

## 2024-05-30 DIAGNOSIS — Z00.6 RESEARCH STUDY PATIENT: ICD-10-CM

## 2024-05-30 NOTE — RESEARCH NOTE
Confirmed with the participant which designated provider they would like study results shared with (Renay Gresham). Patient will have an opportunity to share the results with any providers of their choosing in the future by accessing their results from [facility].

## 2024-05-31 ENCOUNTER — HOSPITAL ENCOUNTER (OUTPATIENT)
Dept: LAB | Facility: MEDICAL CENTER | Age: 49
End: 2024-05-31
Attending: OBSTETRICS & GYNECOLOGY
Payer: COMMERCIAL

## 2024-05-31 ENCOUNTER — GYNECOLOGY VISIT (OUTPATIENT)
Dept: OBGYN | Facility: CLINIC | Age: 49
End: 2024-05-31
Payer: COMMERCIAL

## 2024-05-31 VITALS
WEIGHT: 186 LBS | SYSTOLIC BLOOD PRESSURE: 100 MMHG | DIASTOLIC BLOOD PRESSURE: 60 MMHG | BODY MASS INDEX: 28.19 KG/M2 | HEIGHT: 68 IN

## 2024-05-31 DIAGNOSIS — N93.9 SCANT VAGINAL BLEEDING: Primary | ICD-10-CM

## 2024-05-31 DIAGNOSIS — Z20.2 POSSIBLE EXPOSURE TO STD: ICD-10-CM

## 2024-05-31 LAB
HBV SURFACE AG SER QL: NORMAL
HIV 1+2 AB+HIV1 P24 AG SERPL QL IA: NORMAL
T PALLIDUM AB SER QL IA: NORMAL

## 2024-05-31 PROCEDURE — 87389 HIV-1 AG W/HIV-1&-2 AB AG IA: CPT

## 2024-05-31 PROCEDURE — 86780 TREPONEMA PALLIDUM: CPT

## 2024-05-31 PROCEDURE — 36415 COLL VENOUS BLD VENIPUNCTURE: CPT

## 2024-05-31 PROCEDURE — 87340 HEPATITIS B SURFACE AG IA: CPT

## 2024-05-31 RX ORDER — OXYCODONE HYDROCHLORIDE 5 MG/1
TABLET ORAL
COMMUNITY
End: 2024-05-31 | Stop reason: CLARIF

## 2024-05-31 RX ORDER — AMOXICILLIN AND CLAVULANATE POTASSIUM 875; 125 MG/1; MG/1
TABLET, FILM COATED ORAL
COMMUNITY
End: 2024-05-31 | Stop reason: CLARIF

## 2024-05-31 RX ORDER — NICOTINE 21 MG/24HR
PATCH, TRANSDERMAL 24 HOURS TRANSDERMAL
COMMUNITY
End: 2024-05-31 | Stop reason: CLARIF

## 2024-05-31 RX ORDER — CLINDAMYCIN HYDROCHLORIDE 300 MG/1
CAPSULE ORAL
COMMUNITY
End: 2024-05-31 | Stop reason: CLARIF

## 2024-05-31 RX ORDER — IBUPROFEN 600 MG/1
TABLET ORAL
COMMUNITY
End: 2024-05-31 | Stop reason: CLARIF

## 2024-05-31 RX ORDER — METRONIDAZOLE 7.5 MG/G
GEL VAGINAL
COMMUNITY
End: 2024-05-31 | Stop reason: CLARIF

## 2024-05-31 RX ORDER — AMOXICILLIN 500 MG/1
CAPSULE ORAL
COMMUNITY
End: 2024-05-31 | Stop reason: CLARIF

## 2024-05-31 RX ORDER — METHOCARBAMOL 750 MG/1
TABLET, FILM COATED ORAL
COMMUNITY
End: 2024-05-31 | Stop reason: CLARIF

## 2024-05-31 RX ORDER — CIPROFLOXACIN 500 MG/1
TABLET, FILM COATED ORAL
COMMUNITY
End: 2024-05-31 | Stop reason: CLARIF

## 2024-05-31 RX ORDER — DOXYCYCLINE HYCLATE 100 MG/1
CAPSULE ORAL
COMMUNITY
End: 2024-05-31 | Stop reason: CLARIF

## 2024-05-31 RX ORDER — KETOROLAC TROMETHAMINE 10 MG/1
1 TABLET, FILM COATED ORAL EVERY 6 HOURS PRN
COMMUNITY
End: 2024-05-31 | Stop reason: CLARIF

## 2024-05-31 RX ORDER — OXYCODONE HYDROCHLORIDE AND ACETAMINOPHEN 5; 325 MG/1; MG/1
TABLET ORAL
COMMUNITY
End: 2024-05-31 | Stop reason: CLARIF

## 2024-05-31 RX ORDER — HYDROCODONE BITARTRATE AND ACETAMINOPHEN 5; 325 MG/1; MG/1
TABLET ORAL
COMMUNITY
End: 2024-05-31 | Stop reason: CLARIF

## 2024-05-31 RX ORDER — CHLORHEXIDINE GLUCONATE ORAL RINSE 1.2 MG/ML
SOLUTION DENTAL
COMMUNITY

## 2024-05-31 RX ORDER — IBUPROFEN 800 MG/1
1 TABLET ORAL EVERY 8 HOURS PRN
COMMUNITY
End: 2024-05-31 | Stop reason: CLARIF

## 2024-05-31 RX ORDER — HYDROCODONE BITARTRATE AND ACETAMINOPHEN 7.5; 325 MG/1; MG/1
TABLET ORAL
COMMUNITY
End: 2024-05-31 | Stop reason: CLARIF

## 2024-05-31 RX ORDER — CYCLOBENZAPRINE HCL 5 MG
TABLET ORAL
COMMUNITY
End: 2024-05-31 | Stop reason: CLARIF

## 2024-05-31 RX ORDER — DIAZEPAM 10 MG/1
TABLET ORAL
COMMUNITY
End: 2024-05-31 | Stop reason: CLARIF

## 2024-05-31 ASSESSMENT — FIBROSIS 4 INDEX: FIB4 SCORE: 1.19

## 2024-05-31 NOTE — PROGRESS NOTES
Patient here for annual exam  Last pap done/result: per pt back in 2016   LMP: NA   BCM: NA  Last mammogram, if applicable: 4/20/232 wnl   Phone number/Pharmacy verified
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
## 2024-06-04 DIAGNOSIS — G25.81 RESTLESS LEG SYNDROME: ICD-10-CM

## 2024-06-04 DIAGNOSIS — D72.10 EOSINOPHILIA, UNSPECIFIED TYPE: ICD-10-CM

## 2024-06-04 NOTE — PROGRESS NOTES
Pt with eosinophilia. Abs of 1.9. Unclear etiology. Will repeat CBC. If continues to be elevated pt to discuss at next appointment (6/17)

## 2024-06-06 ENCOUNTER — HOSPITAL ENCOUNTER (OUTPATIENT)
Dept: LAB | Facility: MEDICAL CENTER | Age: 49
End: 2024-06-06
Payer: COMMERCIAL

## 2024-06-06 ENCOUNTER — HOSPITAL ENCOUNTER (OUTPATIENT)
Dept: LAB | Facility: MEDICAL CENTER | Age: 49
End: 2024-06-06
Attending: FAMILY MEDICINE
Payer: COMMERCIAL

## 2024-06-06 DIAGNOSIS — Z00.6 RESEARCH STUDY PATIENT: ICD-10-CM

## 2024-06-06 DIAGNOSIS — D72.10 EOSINOPHILIA, UNSPECIFIED TYPE: ICD-10-CM

## 2024-06-06 LAB
BASOPHILS # BLD AUTO: 1.7 % (ref 0–1.8)
BASOPHILS # BLD: 0.17 K/UL (ref 0–0.12)
EOSINOPHIL # BLD AUTO: 2.06 K/UL (ref 0–0.51)
EOSINOPHIL NFR BLD: 21 % (ref 0–6.9)
ERYTHROCYTE [DISTWIDTH] IN BLOOD BY AUTOMATED COUNT: 45.9 FL (ref 35.9–50)
HCT VFR BLD AUTO: 39.2 % (ref 37–47)
HGB BLD-MCNC: 13.2 G/DL (ref 12–16)
LYMPHOCYTES # BLD AUTO: 2.41 K/UL (ref 1–4.8)
LYMPHOCYTES NFR BLD: 24.6 % (ref 22–41)
MANUAL DIFF BLD: NORMAL
MCH RBC QN AUTO: 30.6 PG (ref 27–33)
MCHC RBC AUTO-ENTMCNC: 33.7 G/DL (ref 32.2–35.5)
MCV RBC AUTO: 90.7 FL (ref 81.4–97.8)
MONOCYTES # BLD AUTO: 0.86 K/UL (ref 0–0.85)
MONOCYTES NFR BLD AUTO: 8.8 % (ref 0–13.4)
MORPHOLOGY BLD-IMP: NORMAL
NEUTROPHILS # BLD AUTO: 4.3 K/UL (ref 1.82–7.42)
NEUTROPHILS NFR BLD: 43.9 % (ref 44–72)
NRBC # BLD AUTO: 0 K/UL
NRBC BLD-RTO: 0 /100 WBC (ref 0–0.2)
PLATELET # BLD AUTO: 374 K/UL (ref 164–446)
PLATELET BLD QL SMEAR: NORMAL
PMV BLD AUTO: 9 FL (ref 9–12.9)
RBC # BLD AUTO: 4.32 M/UL (ref 4.2–5.4)
RBC BLD AUTO: NORMAL
WBC # BLD AUTO: 9.8 K/UL (ref 4.8–10.8)

## 2024-06-06 PROCEDURE — 85007 BL SMEAR W/DIFF WBC COUNT: CPT

## 2024-06-06 PROCEDURE — 36415 COLL VENOUS BLD VENIPUNCTURE: CPT

## 2024-06-06 PROCEDURE — 85027 COMPLETE CBC AUTOMATED: CPT

## 2024-06-17 DIAGNOSIS — G25.81 RESTLESS LEG SYNDROME: ICD-10-CM

## 2024-06-17 LAB
ELF SCORE: 9.11 - (ref 9.8–11.3)
HA (HYALURONIC ACID): 38.63 NG/ML
PIIINP (AMINO-TERMINAL PROPEPTIDE): 8.37 NG/ML
RELATIVE RISK: NORMAL
RISK GROUP: NORMAL
RISK: 3.3 %
TIMP-1 (TISSUE INHIBITOR OF MMP1): 220.1 NG/ML

## 2024-06-17 RX ORDER — ROPINIROLE 0.25 MG/1
0.25 TABLET, FILM COATED ORAL EVERY EVENING
Qty: 30 TABLET | Refills: 0 | Status: SHIPPED | OUTPATIENT
Start: 2024-06-17 | End: 2024-06-27

## 2024-06-17 NOTE — TELEPHONE ENCOUNTER
Name from pharmacy: ROPINIROLE HCL 0.25 MG TABLET          Will file in chart as: ROPINIRole (REQUIP) 0.25 MG Tab    Sig: TAKE 1 TABLET BY MOUTH EVERY EVENING    Disp: 30 Tablet    Refills: 0 (Pharmacy requested: Not specified)    Start: 6/17/2024    Class: Normal    Non-formulary For: Restless leg syndrome    Last ordered: 4 weeks ago (5/20/2024) by Martinez Calderón M.D.    Last refill: 5/20/2024    Rx #: [id number]       To be filled at: [facility]/pharmacy #[id number] - [place], [place] - [address]

## 2024-06-19 NOTE — RESULT ENCOUNTER NOTE
Dear Erna,    You recently participated in [facility]'s REYES genetic research study.  Your result showed you are at low risk for liver fibrosis with an ELF score below 9.8 (your score was  9.11).     This low-risk score is a marker and lets us know that you are at lower risk than some people for liver fibrosis in the future.  Your previous liver function tests were reviewed. Because your LFTs were below 30 (this is normal) and you have no known history of liver steatosis, lifestyle and chronic disease management comprise your treatment.    Your low ELF score is reassuring, but because you have risk factors for liver fibrosis, lifestyle modification are your best treatments at this time.    Reduce (or eliminate!) carbonated or sweetened drinks.  Avoid food or drink containing high fructose corn syrup.  Do not eat processed foods.  Control chronic conditions such as diabetes and elevated cholesterol.   Consider a consult with a nutritionist.  Did you know that a 10% reduction in your weight may lead to improvement or reversal of REYES?    Best,  Mary Anne Garcia MD

## 2024-06-23 LAB
APOB+LDLR+PCSK9 GENE MUT ANL BLD/T: NOT DETECTED
BRCA1+BRCA2 DEL+DUP + FULL MUT ANL BLD/T: NOT DETECTED
MLH1+MSH2+MSH6+PMS2 GN DEL+DUP+FUL M: NOT DETECTED

## 2024-06-27 ENCOUNTER — OFFICE VISIT (OUTPATIENT)
Dept: MEDICAL GROUP | Facility: CLINIC | Age: 49
End: 2024-06-27
Payer: COMMERCIAL

## 2024-06-27 VITALS
SYSTOLIC BLOOD PRESSURE: 112 MMHG | HEIGHT: 68 IN | WEIGHT: 180 LBS | DIASTOLIC BLOOD PRESSURE: 78 MMHG | HEART RATE: 99 BPM | RESPIRATION RATE: 16 BRPM | TEMPERATURE: 97.9 F | BODY MASS INDEX: 27.28 KG/M2 | OXYGEN SATURATION: 94 %

## 2024-06-27 DIAGNOSIS — G25.81 RESTLESS LEG SYNDROME: ICD-10-CM

## 2024-06-27 RX ORDER — GABAPENTIN 300 MG/1
300 CAPSULE ORAL 2 TIMES DAILY
Qty: 60 CAPSULE | Refills: 2 | Status: SHIPPED | OUTPATIENT
Start: 2024-06-27

## 2024-06-27 ASSESSMENT — FIBROSIS 4 INDEX: FIB4 SCORE: 1.07

## 2024-06-28 NOTE — PROGRESS NOTES
"Subjective:     CC: Follow-up for RLS.    HPI:   Erna presents today for follow on RLS. Patient states no relief on ropinirole and would like to discontinue use as patient states \" she does not feel right\" on this medication.  Experiencing \" weird feeling, with worsening anxiety\".  Patient does state however, gabapentin does provide good relief, however later in the day patient feels RLS symptoms are back.  Tolerated 300g gabapentin well at this time.     We also discussed care gaps.  Patient states that she has been under a lot of stress as she is a caregiver for a , who recently was diagnosed with lung cancer.  She states she has been very busy but will be back to address most of her care gaps at that time.  No other complaints today please see assessment and plan for further details.    Current Outpatient Medications Ordered in Epic   Medication Sig Dispense Refill    gabapentin (NEURONTIN) 300 MG Cap Take 1 Capsule by mouth 2 times a day. 60 Capsule 2    chlorhexidine (PERIDEX) 0.12 % Solution       propranolol (INDERAL) 20 MG Tab Take 1 Tablet by mouth 3 times a day. 90 Tablet 3     No current Epic-ordered facility-administered medications on file.       ROS:  Gen: no fevers/chills, no changes in weight  Pulm: no sob, no cough  CV: no chest pain, no palpitations  GI: no nausea/vomiting, no diarrhea    Objective:     Exam:  /78 (BP Location: Left arm, Patient Position: Sitting, BP Cuff Size: Adult)   Pulse 99   Temp 36.6 °C (97.9 °F) (Temporal)   Resp 16   Ht 1.727 m (5' 8\")   Wt 81.6 kg (180 lb)   LMP 09/02/2012   SpO2 94%   BMI 27.37 kg/m²  Body mass index is 27.37 kg/m².    Gen: Alert and oriented, No apparent distress.  Neck: Neck is supple without lymphadenopathy.  Lungs: Normal effort, CTA bilaterally, no wheezes, rhonchi, or rales  CV: Regular rate and rhythm. No murmurs, rubs, or gallops.  Ext: No clubbing, cyanosis, edema.    Labs:   Results for orders placed or performed during the " hospital encounter of 06/06/24   CBC WITH DIFFERENTIAL   Result Value Ref Range    WBC 9.8 4.8 - 10.8 K/uL    RBC 4.32 4.20 - 5.40 M/uL    Hemoglobin 13.2 12.0 - 16.0 g/dL    Hematocrit 39.2 37.0 - 47.0 %    MCV 90.7 81.4 - 97.8 fL    MCH 30.6 27.0 - 33.0 pg    MCHC 33.7 32.2 - 35.5 g/dL    RDW 45.9 35.9 - 50.0 fL    Platelet Count 374 164 - 446 K/uL    MPV 9.0 9.0 - 12.9 fL    Neutrophils-Polys 43.90 (L) 44.00 - 72.00 %    Lymphocytes 24.60 22.00 - 41.00 %    Monocytes 8.80 0.00 - 13.40 %    Eosinophils 21.00 (H) 0.00 - 6.90 %    Basophils 1.70 0.00 - 1.80 %    Nucleated RBC 0.00 0.00 - 0.20 /100 WBC    Neutrophils (Absolute) 4.30 1.82 - 7.42 K/uL    Lymphs (Absolute) 2.41 1.00 - 4.80 K/uL    Monos (Absolute) 0.86 (H) 0.00 - 0.85 K/uL    Eos (Absolute) 2.06 (H) 0.00 - 0.51 K/uL    Baso (Absolute) 0.17 (H) 0.00 - 0.12 K/uL    NRBC (Absolute) 0.00 K/uL   DIFFERENTIAL MANUAL   Result Value Ref Range    Manual Diff Status PERFORMED    PERIPHERAL SMEAR REVIEW   Result Value Ref Range    Peripheral Smear Review see below    PLATELET ESTIMATE   Result Value Ref Range    Plt Estimation Normal    MORPHOLOGY   Result Value Ref Range    RBC Morphology Normal      Assessment & Plan:     49 y.o. female with the following:     Problem List Items Addressed This Visit       Restless leg syndrome     Started 6-9 months ago when patient stopped opioids. Previously used fentanyl a few years back. Has used vicodin q4-6 hrs since. Since quitting has been unable to keep legs still. Associated with need/desire to keep moving and restlessness.  Plan:  -Increase gabapentin 300 g daily to twice daily  -Follow-up in 6 to 8 weeks to assess tolerance and symptomatic relief.           Follow up: Scheduled for 7/26/2024. May schedule sooner if concerns arise.    Renay Gresham M.D.  PGY2 Resident  [facility], [facility]

## 2024-06-28 NOTE — ASSESSMENT & PLAN NOTE
Started 6-9 months ago when patient stopped opioids. Previously used fentanyl a few years back. Has used vicodin q4-6 hrs since. Since quitting has been unable to keep legs still. Associated with need/desire to keep moving and restlessness.  Plan:  -Increase gabapentin 300 g daily to twice daily  -Follow-up in 6 to 8 weeks to assess tolerance and symptomatic relief.

## 2024-09-06 NOTE — TELEPHONE ENCOUNTER
Received request via: Pharmacy    Was the patient seen in the last year in this department? Yes    Does the patient have an active prescription (recently filled or refills available) for medication(s) requested? No    Pharmacy Name: [facility]/pharmacy #[id number] - [place], [place] - [address]

## 2024-09-09 RX ORDER — GABAPENTIN 300 MG/1
300 CAPSULE ORAL 2 TIMES DAILY
Qty: 60 CAPSULE | Refills: 2 | Status: SHIPPED | OUTPATIENT
Start: 2024-09-09

## 2024-09-09 RX ORDER — PROPRANOLOL HCL 20 MG
20 TABLET ORAL 3 TIMES DAILY
Qty: 90 TABLET | Refills: 3 | Status: SHIPPED | OUTPATIENT
Start: 2024-09-09

## 2024-09-09 NOTE — TELEPHONE ENCOUNTER
Received request via: Pharmacy    Was the patient seen in the last year in this department? Yes    Does the patient have an active prescription (recently filled or refills available) for medication(s) requested? No    Pharmacy Name:   [facility]/pharmacy #[id number] - [place], [place] - [address]

## 2024-09-12 RX ORDER — NICOTINE 21 MG/24HR
1 PATCH, TRANSDERMAL 24 HOURS TRANSDERMAL EVERY 24 HOURS
Qty: 28 PATCH | Refills: 1 | Status: SHIPPED | OUTPATIENT
Start: 2024-09-12 | End: 2024-10-24

## 2024-09-12 RX ORDER — ZOLMITRIPTAN 5 MG/1
5 TABLET, FILM COATED ORAL 2 TIMES DAILY PRN
Qty: 6 TABLET | Refills: 2 | Status: SHIPPED | OUTPATIENT
Start: 2024-09-12

## 2024-10-28 ENCOUNTER — HOSPITAL ENCOUNTER (OUTPATIENT)
Dept: LAB | Facility: MEDICAL CENTER | Age: 49
End: 2024-10-28
Attending: STUDENT IN AN ORGANIZED HEALTH CARE EDUCATION/TRAINING PROGRAM
Payer: COMMERCIAL

## 2024-10-28 LAB
ALBUMIN SERPL BCP-MCNC: 4.1 G/DL (ref 3.2–4.9)
ALBUMIN/GLOB SERPL: 1.4 G/DL
ALP SERPL-CCNC: 85 U/L (ref 30–99)
ALT SERPL-CCNC: 8 U/L (ref 2–50)
ANION GAP SERPL CALC-SCNC: 9 MMOL/L (ref 7–16)
AST SERPL-CCNC: 20 U/L (ref 12–45)
BILIRUB SERPL-MCNC: 0.3 MG/DL (ref 0.1–1.5)
BUN SERPL-MCNC: 14 MG/DL (ref 8–22)
CALCIUM ALBUM COR SERPL-MCNC: 9.2 MG/DL (ref 8.5–10.5)
CALCIUM SERPL-MCNC: 9.3 MG/DL (ref 8.5–10.5)
CHLORIDE SERPL-SCNC: 105 MMOL/L (ref 96–112)
CO2 SERPL-SCNC: 26 MMOL/L (ref 20–33)
CREAT SERPL-MCNC: 1.05 MG/DL (ref 0.5–1.4)
FERRITIN SERPL-MCNC: 127 NG/ML (ref 10–291)
GFR SERPLBLD CREATININE-BSD FMLA CKD-EPI: 66 ML/MIN/1.73 M 2
GLOBULIN SER CALC-MCNC: 2.9 G/DL (ref 1.9–3.5)
GLUCOSE SERPL-MCNC: 93 MG/DL (ref 65–99)
IRON SATN MFR SERPL: 33 % (ref 15–55)
IRON SERPL-MCNC: 87 UG/DL (ref 40–170)
POTASSIUM SERPL-SCNC: 4 MMOL/L (ref 3.6–5.5)
PROT SERPL-MCNC: 7 G/DL (ref 6–8.2)
SODIUM SERPL-SCNC: 140 MMOL/L (ref 135–145)
TIBC SERPL-MCNC: 261 UG/DL (ref 250–450)
UIBC SERPL-MCNC: 174 UG/DL (ref 110–370)

## 2024-10-28 PROCEDURE — 80053 COMPREHEN METABOLIC PANEL: CPT

## 2024-10-28 PROCEDURE — 83550 IRON BINDING TEST: CPT

## 2024-10-28 PROCEDURE — 83540 ASSAY OF IRON: CPT

## 2024-10-28 PROCEDURE — 36415 COLL VENOUS BLD VENIPUNCTURE: CPT

## 2024-10-28 PROCEDURE — 82728 ASSAY OF FERRITIN: CPT

## 2024-12-18 ENCOUNTER — HOSPITAL ENCOUNTER (OUTPATIENT)
Dept: LAB | Facility: MEDICAL CENTER | Age: 49
End: 2024-12-18
Attending: OBSTETRICS & GYNECOLOGY
Payer: COMMERCIAL

## 2024-12-18 LAB
BASOPHILS # BLD AUTO: 0.8 % (ref 0–1.8)
BASOPHILS # BLD: 0.1 K/UL (ref 0–0.12)
EOSINOPHIL # BLD AUTO: 0.66 K/UL (ref 0–0.51)
EOSINOPHIL NFR BLD: 5.5 % (ref 0–6.9)
ERYTHROCYTE [DISTWIDTH] IN BLOOD BY AUTOMATED COUNT: 45.1 FL (ref 35.9–50)
HCT VFR BLD AUTO: 44.6 % (ref 37–47)
HGB BLD-MCNC: 14.6 G/DL (ref 12–16)
IMM GRANULOCYTES # BLD AUTO: 0.03 K/UL (ref 0–0.11)
IMM GRANULOCYTES NFR BLD AUTO: 0.2 % (ref 0–0.9)
LYMPHOCYTES # BLD AUTO: 2.94 K/UL (ref 1–4.8)
LYMPHOCYTES NFR BLD: 24.3 % (ref 22–41)
MCH RBC QN AUTO: 29.7 PG (ref 27–33)
MCHC RBC AUTO-ENTMCNC: 32.7 G/DL (ref 32.2–35.5)
MCV RBC AUTO: 90.7 FL (ref 81.4–97.8)
MONOCYTES # BLD AUTO: 0.75 K/UL (ref 0–0.85)
MONOCYTES NFR BLD AUTO: 6.2 % (ref 0–13.4)
NEUTROPHILS # BLD AUTO: 7.63 K/UL (ref 1.82–7.42)
NEUTROPHILS NFR BLD: 63 % (ref 44–72)
NRBC # BLD AUTO: 0 K/UL
NRBC BLD-RTO: 0 /100 WBC (ref 0–0.2)
PLATELET # BLD AUTO: 429 K/UL (ref 164–446)
PMV BLD AUTO: 8.5 FL (ref 9–12.9)
RBC # BLD AUTO: 4.92 M/UL (ref 4.2–5.4)
WBC # BLD AUTO: 12.1 K/UL (ref 4.8–10.8)

## 2024-12-18 PROCEDURE — 85245 CLOT FACTOR VIII VW RISTOCTN: CPT

## 2024-12-18 PROCEDURE — 85246 CLOT FACTOR VIII VW ANTIGEN: CPT

## 2024-12-18 PROCEDURE — 85025 COMPLETE CBC W/AUTO DIFF WBC: CPT

## 2024-12-18 PROCEDURE — 85240 CLOT FACTOR VIII AHG 1 STAGE: CPT

## 2024-12-18 PROCEDURE — 36415 COLL VENOUS BLD VENIPUNCTURE: CPT

## 2024-12-22 LAB
FACT VIII ACT/NOR PPP: 113 % (ref 56–191)
VWF AG ACT/NOR PPP IA: 110 % (ref 52–214)
VWF:RCO ACT/NOR PPP PL AGG: 115 % (ref 51–215)

## 2024-12-31 ENCOUNTER — HOSPITAL ENCOUNTER (OUTPATIENT)
Dept: LAB | Facility: MEDICAL CENTER | Age: 49
End: 2024-12-31
Attending: OBSTETRICS & GYNECOLOGY
Payer: COMMERCIAL

## 2024-12-31 LAB
BASOPHILS # BLD AUTO: 1.1 % (ref 0–1.8)
BASOPHILS # BLD: 0.1 K/UL (ref 0–0.12)
EOSINOPHIL # BLD AUTO: 0.74 K/UL (ref 0–0.51)
EOSINOPHIL NFR BLD: 8.1 % (ref 0–6.9)
ERYTHROCYTE [DISTWIDTH] IN BLOOD BY AUTOMATED COUNT: 45.2 FL (ref 35.9–50)
HCT VFR BLD AUTO: 42 % (ref 37–47)
HGB BLD-MCNC: 13.9 G/DL (ref 12–16)
IMM GRANULOCYTES # BLD AUTO: 0.02 K/UL (ref 0–0.11)
IMM GRANULOCYTES NFR BLD AUTO: 0.2 % (ref 0–0.9)
LYMPHOCYTES # BLD AUTO: 3.58 K/UL (ref 1–4.8)
LYMPHOCYTES NFR BLD: 39.4 % (ref 22–41)
MCH RBC QN AUTO: 30 PG (ref 27–33)
MCHC RBC AUTO-ENTMCNC: 33.1 G/DL (ref 32.2–35.5)
MCV RBC AUTO: 90.5 FL (ref 81.4–97.8)
MONOCYTES # BLD AUTO: 0.93 K/UL (ref 0–0.85)
MONOCYTES NFR BLD AUTO: 10.2 % (ref 0–13.4)
NEUTROPHILS # BLD AUTO: 3.72 K/UL (ref 1.82–7.42)
NEUTROPHILS NFR BLD: 41 % (ref 44–72)
NRBC # BLD AUTO: 0 K/UL
NRBC BLD-RTO: 0 /100 WBC (ref 0–0.2)
RBC # BLD AUTO: 4.64 M/UL (ref 4.2–5.4)
WBC # BLD AUTO: 9.1 K/UL (ref 4.8–10.8)

## 2024-12-31 PROCEDURE — 85018 HEMOGLOBIN: CPT

## 2024-12-31 PROCEDURE — 85246 CLOT FACTOR VIII VW ANTIGEN: CPT

## 2024-12-31 PROCEDURE — 85240 CLOT FACTOR VIII AHG 1 STAGE: CPT

## 2024-12-31 PROCEDURE — 85041 AUTOMATED RBC COUNT: CPT

## 2024-12-31 PROCEDURE — 36415 COLL VENOUS BLD VENIPUNCTURE: CPT

## 2024-12-31 PROCEDURE — 85014 HEMATOCRIT: CPT

## 2024-12-31 PROCEDURE — 85048 AUTOMATED LEUKOCYTE COUNT: CPT

## 2024-12-31 PROCEDURE — 85245 CLOT FACTOR VIII VW RISTOCTN: CPT

## 2025-01-03 LAB
FACT VIII ACT/NOR PPP: 96 % (ref 56–191)
VWF AG ACT/NOR PPP IA: 103 % (ref 52–214)
VWF:RCO ACT/NOR PPP PL AGG: 83 % (ref 51–215)

## 2025-02-03 ENCOUNTER — APPOINTMENT (OUTPATIENT)
Dept: ADMISSIONS | Facility: MEDICAL CENTER | Age: 50
End: 2025-02-03
Attending: OBSTETRICS & GYNECOLOGY
Payer: COMMERCIAL

## 2025-02-07 ENCOUNTER — PRE-ADMISSION TESTING (OUTPATIENT)
Dept: ADMISSIONS | Facility: MEDICAL CENTER | Age: 50
End: 2025-02-07
Attending: OBSTETRICS & GYNECOLOGY
Payer: COMMERCIAL

## 2025-02-07 RX ORDER — TRAZODONE HYDROCHLORIDE 50 MG/1
50 TABLET ORAL
COMMUNITY

## 2025-02-07 RX ORDER — METRONIDAZOLE 500 MG/1
500 TABLET ORAL 2 TIMES DAILY
COMMUNITY
Start: 2025-02-06

## 2025-02-07 NOTE — PREADMIT AVS NOTE
A catheter was exchanged for acatheter.  Current Medications   Medication Instructions    metroNIDAZOLE (FLAGYL) 500 MG Tab Follow instructions from surgeon or specialist.    traZODone (DESYREL) 50 MG Tab as needed Continue as prescribed    zolmitriptan (ZOMIG) 5 MG tablet as needed Hold medication day of procedure    propranolol (INDERAL) 20 MG Tab Continue as prescribed    gabapentin (NEURONTIN) 300 MG Cap as needed Continue as prescribed

## 2025-02-11 ENCOUNTER — PRE-ADMISSION TESTING (OUTPATIENT)
Dept: ADMISSIONS | Facility: MEDICAL CENTER | Age: 50
End: 2025-02-11
Attending: OBSTETRICS & GYNECOLOGY
Payer: COMMERCIAL

## 2025-02-11 ENCOUNTER — HOSPITAL ENCOUNTER (OUTPATIENT)
Dept: RADIOLOGY | Facility: MEDICAL CENTER | Age: 50
End: 2025-02-11
Attending: OBSTETRICS & GYNECOLOGY
Payer: COMMERCIAL

## 2025-02-11 DIAGNOSIS — Z01.810 PRE-OPERATIVE CARDIOVASCULAR EXAMINATION: ICD-10-CM

## 2025-02-11 DIAGNOSIS — Z01.812 PRE-OPERATIVE LABORATORY EXAMINATION: ICD-10-CM

## 2025-02-11 DIAGNOSIS — Z01.811 PRE-OPERATIVE RESPIRATORY EXAMINATION: ICD-10-CM

## 2025-02-11 LAB
ABO GROUP BLD: NORMAL
ALBUMIN SERPL BCP-MCNC: 3.7 G/DL (ref 3.2–4.9)
ALBUMIN/GLOB SERPL: 1.3 G/DL
ALP SERPL-CCNC: 79 U/L (ref 30–99)
ALT SERPL-CCNC: 9 U/L (ref 2–50)
ANION GAP SERPL CALC-SCNC: 10 MMOL/L (ref 7–16)
APTT PPP: 28.3 SEC (ref 24.7–36)
AST SERPL-CCNC: 34 U/L (ref 12–45)
BASOPHILS # BLD AUTO: 1.2 % (ref 0–1.8)
BASOPHILS # BLD: 0.08 K/UL (ref 0–0.12)
BILIRUB SERPL-MCNC: <0.2 MG/DL (ref 0.1–1.5)
BLD GP AB SCN SERPL QL: NORMAL
BUN SERPL-MCNC: 11 MG/DL (ref 8–22)
CALCIUM ALBUM COR SERPL-MCNC: 9.1 MG/DL (ref 8.5–10.5)
CALCIUM SERPL-MCNC: 8.9 MG/DL (ref 8.5–10.5)
CHLORIDE SERPL-SCNC: 104 MMOL/L (ref 96–112)
CO2 SERPL-SCNC: 26 MMOL/L (ref 20–33)
CREAT SERPL-MCNC: 1 MG/DL (ref 0.5–1.4)
EKG IMPRESSION: NORMAL
EOSINOPHIL # BLD AUTO: 0.63 K/UL (ref 0–0.51)
EOSINOPHIL NFR BLD: 9.4 % (ref 0–6.9)
ERYTHROCYTE [DISTWIDTH] IN BLOOD BY AUTOMATED COUNT: 44.5 FL (ref 35.9–50)
GFR SERPLBLD CREATININE-BSD FMLA CKD-EPI: 70 ML/MIN/1.73 M 2
GLOBULIN SER CALC-MCNC: 2.8 G/DL (ref 1.9–3.5)
GLUCOSE SERPL-MCNC: 97 MG/DL (ref 65–99)
HCT VFR BLD AUTO: 40.9 % (ref 37–47)
HGB BLD-MCNC: 13.5 G/DL (ref 12–16)
IMM GRANULOCYTES # BLD AUTO: 0.01 K/UL (ref 0–0.11)
IMM GRANULOCYTES NFR BLD AUTO: 0.1 % (ref 0–0.9)
INR PPP: 0.99 (ref 0.87–1.13)
LYMPHOCYTES # BLD AUTO: 2.98 K/UL (ref 1–4.8)
LYMPHOCYTES NFR BLD: 44.6 % (ref 22–41)
MCH RBC QN AUTO: 30.7 PG (ref 27–33)
MCHC RBC AUTO-ENTMCNC: 33 G/DL (ref 32.2–35.5)
MCV RBC AUTO: 93 FL (ref 81.4–97.8)
MONOCYTES # BLD AUTO: 0.55 K/UL (ref 0–0.85)
MONOCYTES NFR BLD AUTO: 8.2 % (ref 0–13.4)
NEUTROPHILS # BLD AUTO: 2.43 K/UL (ref 1.82–7.42)
NEUTROPHILS NFR BLD: 36.5 % (ref 44–72)
NRBC # BLD AUTO: 0 K/UL
NRBC BLD-RTO: 0 /100 WBC (ref 0–0.2)
PLATELET # BLD AUTO: 326 K/UL (ref 164–446)
PMV BLD AUTO: 8.7 FL (ref 9–12.9)
POTASSIUM SERPL-SCNC: 5 MMOL/L (ref 3.6–5.5)
PROT SERPL-MCNC: 6.5 G/DL (ref 6–8.2)
PROTHROMBIN TIME: 13.1 SEC (ref 12–14.6)
RBC # BLD AUTO: 4.4 M/UL (ref 4.2–5.4)
RH BLD: NORMAL
SODIUM SERPL-SCNC: 140 MMOL/L (ref 135–145)
WBC # BLD AUTO: 6.7 K/UL (ref 4.8–10.8)

## 2025-02-11 PROCEDURE — 71045 X-RAY EXAM CHEST 1 VIEW: CPT

## 2025-02-11 PROCEDURE — 36415 COLL VENOUS BLD VENIPUNCTURE: CPT

## 2025-02-11 PROCEDURE — 86850 RBC ANTIBODY SCREEN: CPT

## 2025-02-11 PROCEDURE — 85610 PROTHROMBIN TIME: CPT

## 2025-02-11 PROCEDURE — 80053 COMPREHEN METABOLIC PANEL: CPT

## 2025-02-11 PROCEDURE — 85730 THROMBOPLASTIN TIME PARTIAL: CPT

## 2025-02-11 PROCEDURE — 85025 COMPLETE CBC W/AUTO DIFF WBC: CPT

## 2025-02-11 PROCEDURE — 86901 BLOOD TYPING SEROLOGIC RH(D): CPT

## 2025-02-11 PROCEDURE — 93010 ELECTROCARDIOGRAM REPORT: CPT | Performed by: INTERNAL MEDICINE

## 2025-02-11 PROCEDURE — 86900 BLOOD TYPING SEROLOGIC ABO: CPT

## 2025-02-11 PROCEDURE — 93005 ELECTROCARDIOGRAM TRACING: CPT | Mod: TC

## 2025-02-17 ENCOUNTER — ANESTHESIA EVENT (OUTPATIENT)
Dept: SURGERY | Facility: MEDICAL CENTER | Age: 50
End: 2025-02-17
Payer: COMMERCIAL

## 2025-02-18 ENCOUNTER — ANESTHESIA (OUTPATIENT)
Dept: SURGERY | Facility: MEDICAL CENTER | Age: 50
End: 2025-02-18
Payer: COMMERCIAL

## 2025-02-18 ENCOUNTER — HOSPITAL ENCOUNTER (OUTPATIENT)
Facility: MEDICAL CENTER | Age: 50
End: 2025-02-18
Attending: OBSTETRICS & GYNECOLOGY | Admitting: OBSTETRICS & GYNECOLOGY
Payer: COMMERCIAL

## 2025-02-18 VITALS
OXYGEN SATURATION: 95 % | HEIGHT: 68 IN | SYSTOLIC BLOOD PRESSURE: 104 MMHG | HEART RATE: 84 BPM | WEIGHT: 183.2 LBS | RESPIRATION RATE: 16 BRPM | BODY MASS INDEX: 27.77 KG/M2 | TEMPERATURE: 96.5 F | DIASTOLIC BLOOD PRESSURE: 65 MMHG

## 2025-02-18 PROCEDURE — 88305 TISSUE EXAM BY PATHOLOGIST: CPT | Performed by: PATHOLOGY

## 2025-02-18 PROCEDURE — 160042 HCHG SURGERY MINUTES - EA ADDL 1 MIN LEVEL 5: Performed by: OBSTETRICS & GYNECOLOGY

## 2025-02-18 PROCEDURE — 700102 HCHG RX REV CODE 250 W/ 637 OVERRIDE(OP): Mod: UD | Performed by: STUDENT IN AN ORGANIZED HEALTH CARE EDUCATION/TRAINING PROGRAM

## 2025-02-18 PROCEDURE — 160031 HCHG SURGERY MINUTES - 1ST 30 MINS LEVEL 5: Performed by: OBSTETRICS & GYNECOLOGY

## 2025-02-18 PROCEDURE — 700105 HCHG RX REV CODE 258: Mod: UD | Performed by: OBSTETRICS & GYNECOLOGY

## 2025-02-18 PROCEDURE — 88304 TISSUE EXAM BY PATHOLOGIST: CPT | Performed by: PATHOLOGY

## 2025-02-18 PROCEDURE — 160015 HCHG STAT PREOP MINUTES: Performed by: OBSTETRICS & GYNECOLOGY

## 2025-02-18 PROCEDURE — A9270 NON-COVERED ITEM OR SERVICE: HCPCS | Mod: UD | Performed by: STUDENT IN AN ORGANIZED HEALTH CARE EDUCATION/TRAINING PROGRAM

## 2025-02-18 PROCEDURE — 160009 HCHG ANES TIME/MIN: Performed by: OBSTETRICS & GYNECOLOGY

## 2025-02-18 PROCEDURE — 88304 TISSUE EXAM BY PATHOLOGIST: CPT | Mod: 26 | Performed by: PATHOLOGY

## 2025-02-18 PROCEDURE — 700105 HCHG RX REV CODE 258: Mod: UD | Performed by: STUDENT IN AN ORGANIZED HEALTH CARE EDUCATION/TRAINING PROGRAM

## 2025-02-18 PROCEDURE — 700101 HCHG RX REV CODE 250: Mod: UD | Performed by: STUDENT IN AN ORGANIZED HEALTH CARE EDUCATION/TRAINING PROGRAM

## 2025-02-18 PROCEDURE — 160048 HCHG OR STATISTICAL LEVEL 1-5: Performed by: OBSTETRICS & GYNECOLOGY

## 2025-02-18 PROCEDURE — 700111 HCHG RX REV CODE 636 W/ 250 OVERRIDE (IP): Mod: UD | Performed by: STUDENT IN AN ORGANIZED HEALTH CARE EDUCATION/TRAINING PROGRAM

## 2025-02-18 PROCEDURE — 160035 HCHG PACU - 1ST 60 MINS PHASE I: Performed by: OBSTETRICS & GYNECOLOGY

## 2025-02-18 PROCEDURE — 88305 TISSUE EXAM BY PATHOLOGIST: CPT | Mod: 26 | Performed by: PATHOLOGY

## 2025-02-18 PROCEDURE — 502714 HCHG ROBOTIC SURGERY SERVICES: Performed by: OBSTETRICS & GYNECOLOGY

## 2025-02-18 PROCEDURE — 160046 HCHG PACU - 1ST 60 MINS PHASE II: Performed by: OBSTETRICS & GYNECOLOGY

## 2025-02-18 PROCEDURE — 88307 TISSUE EXAM BY PATHOLOGIST: CPT | Performed by: PATHOLOGY

## 2025-02-18 PROCEDURE — 700101 HCHG RX REV CODE 250: Mod: UD | Performed by: OBSTETRICS & GYNECOLOGY

## 2025-02-18 PROCEDURE — 160047 HCHG PACU  - EA ADDL 30 MINS PHASE II: Performed by: OBSTETRICS & GYNECOLOGY

## 2025-02-18 PROCEDURE — 160002 HCHG RECOVERY MINUTES (STAT): Performed by: OBSTETRICS & GYNECOLOGY

## 2025-02-18 PROCEDURE — 88307 TISSUE EXAM BY PATHOLOGIST: CPT | Mod: 26 | Performed by: PATHOLOGY

## 2025-02-18 PROCEDURE — 160036 HCHG PACU - EA ADDL 30 MINS PHASE I: Performed by: OBSTETRICS & GYNECOLOGY

## 2025-02-18 PROCEDURE — 160025 RECOVERY II MINUTES (STATS): Performed by: OBSTETRICS & GYNECOLOGY

## 2025-02-18 RX ORDER — OXYCODONE HCL 5 MG/5 ML
5 SOLUTION, ORAL ORAL
Status: COMPLETED | OUTPATIENT
Start: 2025-02-18 | End: 2025-02-18

## 2025-02-18 RX ORDER — HYDROMORPHONE HYDROCHLORIDE 1 MG/ML
0.4 INJECTION, SOLUTION INTRAMUSCULAR; INTRAVENOUS; SUBCUTANEOUS
Status: DISCONTINUED | OUTPATIENT
Start: 2025-02-18 | End: 2025-02-18 | Stop reason: HOSPADM

## 2025-02-18 RX ORDER — METHOCARBAMOL 100 MG/ML
1000 INJECTION, SOLUTION INTRAMUSCULAR; INTRAVENOUS ONCE
Status: COMPLETED | OUTPATIENT
Start: 2025-02-18 | End: 2025-02-18

## 2025-02-18 RX ORDER — ONDANSETRON 2 MG/ML
INJECTION INTRAMUSCULAR; INTRAVENOUS PRN
Status: DISCONTINUED | OUTPATIENT
Start: 2025-02-18 | End: 2025-02-18 | Stop reason: SURG

## 2025-02-18 RX ORDER — ALBUTEROL SULFATE 5 MG/ML
2.5 SOLUTION RESPIRATORY (INHALATION)
Status: DISCONTINUED | OUTPATIENT
Start: 2025-02-18 | End: 2025-02-18 | Stop reason: HOSPADM

## 2025-02-18 RX ORDER — LIDOCAINE HYDROCHLORIDE 20 MG/ML
INJECTION, SOLUTION EPIDURAL; INFILTRATION; INTRACAUDAL; PERINEURAL PRN
Status: DISCONTINUED | OUTPATIENT
Start: 2025-02-18 | End: 2025-02-18 | Stop reason: SURG

## 2025-02-18 RX ORDER — HYDROMORPHONE HYDROCHLORIDE 1 MG/ML
0.1 INJECTION, SOLUTION INTRAMUSCULAR; INTRAVENOUS; SUBCUTANEOUS
Status: DISCONTINUED | OUTPATIENT
Start: 2025-02-18 | End: 2025-02-18 | Stop reason: HOSPADM

## 2025-02-18 RX ORDER — DIPHENHYDRAMINE HCL 25 MG
50 TABLET ORAL EVERY 6 HOURS PRN
COMMUNITY

## 2025-02-18 RX ORDER — CEFAZOLIN SODIUM 1 G/3ML
INJECTION, POWDER, FOR SOLUTION INTRAMUSCULAR; INTRAVENOUS PRN
Status: DISCONTINUED | OUTPATIENT
Start: 2025-02-18 | End: 2025-02-18 | Stop reason: SURG

## 2025-02-18 RX ORDER — DIPHENHYDRAMINE HYDROCHLORIDE 50 MG/ML
12.5 INJECTION INTRAMUSCULAR; INTRAVENOUS
Status: DISCONTINUED | OUTPATIENT
Start: 2025-02-18 | End: 2025-02-18 | Stop reason: HOSPADM

## 2025-02-18 RX ORDER — ONDANSETRON 2 MG/ML
4 INJECTION INTRAMUSCULAR; INTRAVENOUS
Status: DISCONTINUED | OUTPATIENT
Start: 2025-02-18 | End: 2025-02-18 | Stop reason: HOSPADM

## 2025-02-18 RX ORDER — SODIUM CHLORIDE, SODIUM LACTATE, POTASSIUM CHLORIDE, CALCIUM CHLORIDE 600; 310; 30; 20 MG/100ML; MG/100ML; MG/100ML; MG/100ML
INJECTION, SOLUTION INTRAVENOUS
Status: DISCONTINUED | OUTPATIENT
Start: 2025-02-18 | End: 2025-02-18 | Stop reason: SURG

## 2025-02-18 RX ORDER — BUPIVACAINE HYDROCHLORIDE AND EPINEPHRINE 2.5; 5 MG/ML; UG/ML
INJECTION, SOLUTION EPIDURAL; INFILTRATION; INTRACAUDAL; PERINEURAL
Status: DISCONTINUED | OUTPATIENT
Start: 2025-02-18 | End: 2025-02-18 | Stop reason: HOSPADM

## 2025-02-18 RX ORDER — OXYCODONE HCL 5 MG/5 ML
10 SOLUTION, ORAL ORAL
Status: COMPLETED | OUTPATIENT
Start: 2025-02-18 | End: 2025-02-18

## 2025-02-18 RX ORDER — HYDRALAZINE HYDROCHLORIDE 20 MG/ML
5 INJECTION INTRAMUSCULAR; INTRAVENOUS
Status: DISCONTINUED | OUTPATIENT
Start: 2025-02-18 | End: 2025-02-18 | Stop reason: HOSPADM

## 2025-02-18 RX ORDER — MIDAZOLAM HYDROCHLORIDE 1 MG/ML
INJECTION INTRAMUSCULAR; INTRAVENOUS PRN
Status: DISCONTINUED | OUTPATIENT
Start: 2025-02-18 | End: 2025-02-18 | Stop reason: SURG

## 2025-02-18 RX ORDER — MIDAZOLAM HYDROCHLORIDE 1 MG/ML
1 INJECTION INTRAMUSCULAR; INTRAVENOUS
Status: DISCONTINUED | OUTPATIENT
Start: 2025-02-18 | End: 2025-02-18 | Stop reason: HOSPADM

## 2025-02-18 RX ORDER — HYDROMORPHONE HYDROCHLORIDE 1 MG/ML
0.2 INJECTION, SOLUTION INTRAMUSCULAR; INTRAVENOUS; SUBCUTANEOUS
Status: DISCONTINUED | OUTPATIENT
Start: 2025-02-18 | End: 2025-02-18 | Stop reason: HOSPADM

## 2025-02-18 RX ORDER — METOPROLOL TARTRATE 1 MG/ML
1 INJECTION, SOLUTION INTRAVENOUS
Status: DISCONTINUED | OUTPATIENT
Start: 2025-02-18 | End: 2025-02-18 | Stop reason: HOSPADM

## 2025-02-18 RX ORDER — PHENYLEPHRINE HCL IN 0.9% NACL 1 MG/10 ML
SYRINGE (ML) INTRAVENOUS PRN
Status: DISCONTINUED | OUTPATIENT
Start: 2025-02-18 | End: 2025-02-18 | Stop reason: SURG

## 2025-02-18 RX ORDER — ROCURONIUM BROMIDE 10 MG/ML
INJECTION, SOLUTION INTRAVENOUS PRN
Status: DISCONTINUED | OUTPATIENT
Start: 2025-02-18 | End: 2025-02-18 | Stop reason: SURG

## 2025-02-18 RX ORDER — LABETALOL HYDROCHLORIDE 5 MG/ML
5 INJECTION, SOLUTION INTRAVENOUS
Status: DISCONTINUED | OUTPATIENT
Start: 2025-02-18 | End: 2025-02-18 | Stop reason: HOSPADM

## 2025-02-18 RX ORDER — DEXAMETHASONE SODIUM PHOSPHATE 4 MG/ML
INJECTION, SOLUTION INTRA-ARTICULAR; INTRALESIONAL; INTRAMUSCULAR; INTRAVENOUS; SOFT TISSUE PRN
Status: DISCONTINUED | OUTPATIENT
Start: 2025-02-18 | End: 2025-02-18 | Stop reason: SURG

## 2025-02-18 RX ORDER — EPHEDRINE SULFATE 50 MG/ML
5 INJECTION, SOLUTION INTRAVENOUS
Status: DISCONTINUED | OUTPATIENT
Start: 2025-02-18 | End: 2025-02-18 | Stop reason: HOSPADM

## 2025-02-18 RX ORDER — EPHEDRINE SULFATE 50 MG/ML
INJECTION, SOLUTION INTRAVENOUS PRN
Status: DISCONTINUED | OUTPATIENT
Start: 2025-02-18 | End: 2025-02-18 | Stop reason: SURG

## 2025-02-18 RX ORDER — SODIUM CHLORIDE, SODIUM LACTATE, POTASSIUM CHLORIDE, CALCIUM CHLORIDE 600; 310; 30; 20 MG/100ML; MG/100ML; MG/100ML; MG/100ML
INJECTION, SOLUTION INTRAVENOUS CONTINUOUS
Status: ACTIVE | OUTPATIENT
Start: 2025-02-18 | End: 2025-02-18

## 2025-02-18 RX ORDER — MEPERIDINE HYDROCHLORIDE 25 MG/ML
6.25 INJECTION INTRAMUSCULAR; INTRAVENOUS; SUBCUTANEOUS
Status: DISCONTINUED | OUTPATIENT
Start: 2025-02-18 | End: 2025-02-18 | Stop reason: HOSPADM

## 2025-02-18 RX ADMIN — EPHEDRINE SULFATE 5 MG: 50 INJECTION, SOLUTION INTRAVENOUS at 16:16

## 2025-02-18 RX ADMIN — FENTANYL CITRATE 50 MCG: 50 INJECTION, SOLUTION INTRAMUSCULAR; INTRAVENOUS at 17:16

## 2025-02-18 RX ADMIN — MIDAZOLAM HYDROCHLORIDE 2 MG: 1 INJECTION, SOLUTION INTRAMUSCULAR; INTRAVENOUS at 15:44

## 2025-02-18 RX ADMIN — METHOCARBAMOL 1000 MG: 100 INJECTION, SOLUTION INTRAMUSCULAR; INTRAVENOUS at 17:11

## 2025-02-18 RX ADMIN — PROPOFOL 20 MG: 10 INJECTION, EMULSION INTRAVENOUS at 16:28

## 2025-02-18 RX ADMIN — Medication 100 MCG: at 16:28

## 2025-02-18 RX ADMIN — Medication 100 MCG: at 15:55

## 2025-02-18 RX ADMIN — FENTANYL CITRATE 50 MCG: 50 INJECTION, SOLUTION INTRAMUSCULAR; INTRAVENOUS at 16:26

## 2025-02-18 RX ADMIN — OXYCODONE HYDROCHLORIDE 10 MG: 5 SOLUTION ORAL at 17:09

## 2025-02-18 RX ADMIN — Medication 100 MCG: at 15:50

## 2025-02-18 RX ADMIN — CEFAZOLIN 2 G: 1 INJECTION, POWDER, FOR SOLUTION INTRAMUSCULAR; INTRAVENOUS at 15:54

## 2025-02-18 RX ADMIN — FENTANYL CITRATE 100 MCG: 50 INJECTION, SOLUTION INTRAMUSCULAR; INTRAVENOUS at 15:50

## 2025-02-18 RX ADMIN — SODIUM CHLORIDE, POTASSIUM CHLORIDE, SODIUM LACTATE AND CALCIUM CHLORIDE: 600; 310; 30; 20 INJECTION, SOLUTION INTRAVENOUS at 15:44

## 2025-02-18 RX ADMIN — Medication 100 MCG: at 16:03

## 2025-02-18 RX ADMIN — PROPOFOL 20 MG: 10 INJECTION, EMULSION INTRAVENOUS at 16:26

## 2025-02-18 RX ADMIN — HYDROMORPHONE HYDROCHLORIDE 0.2 MG: 1 INJECTION, SOLUTION INTRAMUSCULAR; INTRAVENOUS; SUBCUTANEOUS at 18:02

## 2025-02-18 RX ADMIN — EPHEDRINE SULFATE 5 MG: 50 INJECTION, SOLUTION INTRAVENOUS at 15:55

## 2025-02-18 RX ADMIN — SODIUM CHLORIDE, POTASSIUM CHLORIDE, SODIUM LACTATE AND CALCIUM CHLORIDE: 600; 310; 30; 20 INJECTION, SOLUTION INTRAVENOUS at 10:54

## 2025-02-18 RX ADMIN — PROPOFOL 160 MG: 10 INJECTION, EMULSION INTRAVENOUS at 15:50

## 2025-02-18 RX ADMIN — HYDROMORPHONE HYDROCHLORIDE 0.4 MG: 1 INJECTION, SOLUTION INTRAMUSCULAR; INTRAVENOUS; SUBCUTANEOUS at 17:52

## 2025-02-18 RX ADMIN — FENTANYL CITRATE 50 MCG: 50 INJECTION, SOLUTION INTRAMUSCULAR; INTRAVENOUS at 16:35

## 2025-02-18 RX ADMIN — PROPOFOL 40 MG: 10 INJECTION, EMULSION INTRAVENOUS at 16:32

## 2025-02-18 RX ADMIN — PROPOFOL 20 MG: 10 INJECTION, EMULSION INTRAVENOUS at 16:23

## 2025-02-18 RX ADMIN — PROPOFOL 40 MG: 10 INJECTION, EMULSION INTRAVENOUS at 16:30

## 2025-02-18 RX ADMIN — HYDROMORPHONE HYDROCHLORIDE 0.2 MG: 1 INJECTION, SOLUTION INTRAMUSCULAR; INTRAVENOUS; SUBCUTANEOUS at 18:24

## 2025-02-18 RX ADMIN — Medication 100 MCG: at 16:00

## 2025-02-18 RX ADMIN — LIDOCAINE HYDROCHLORIDE 80 MG: 20 INJECTION, SOLUTION EPIDURAL; INFILTRATION; INTRACAUDAL; PERINEURAL at 15:50

## 2025-02-18 RX ADMIN — HYDROMORPHONE HYDROCHLORIDE 0.4 MG: 1 INJECTION, SOLUTION INTRAMUSCULAR; INTRAVENOUS; SUBCUTANEOUS at 18:14

## 2025-02-18 RX ADMIN — ROCURONIUM BROMIDE 50 MG: 10 INJECTION INTRAVENOUS at 15:50

## 2025-02-18 RX ADMIN — SUGAMMADEX 350 MG: 100 INJECTION, SOLUTION INTRAVENOUS at 16:25

## 2025-02-18 RX ADMIN — FENTANYL CITRATE 50 MCG: 50 INJECTION, SOLUTION INTRAMUSCULAR; INTRAVENOUS at 16:28

## 2025-02-18 RX ADMIN — FENTANYL CITRATE 50 MCG: 50 INJECTION, SOLUTION INTRAMUSCULAR; INTRAVENOUS at 17:22

## 2025-02-18 RX ADMIN — HYDROMORPHONE HYDROCHLORIDE 0.4 MG: 1 INJECTION, SOLUTION INTRAMUSCULAR; INTRAVENOUS; SUBCUTANEOUS at 17:57

## 2025-02-18 RX ADMIN — ONDANSETRON 4 MG: 2 INJECTION INTRAMUSCULAR; INTRAVENOUS at 15:55

## 2025-02-18 RX ADMIN — DEXAMETHASONE SODIUM PHOSPHATE 4 MG: 4 INJECTION INTRA-ARTICULAR; INTRALESIONAL; INTRAMUSCULAR; INTRAVENOUS; SOFT TISSUE at 15:55

## 2025-02-18 RX ADMIN — HYDROMORPHONE HYDROCHLORIDE 0.4 MG: 1 INJECTION, SOLUTION INTRAMUSCULAR; INTRAVENOUS; SUBCUTANEOUS at 18:19

## 2025-02-18 RX ADMIN — LIDOCAINE HYDROCHLORIDE 0.5 ML: 10 INJECTION, SOLUTION EPIDURAL; INFILTRATION; INTRACAUDAL; PERINEURAL at 10:54

## 2025-02-18 ASSESSMENT — PAIN DESCRIPTION - PAIN TYPE
TYPE: ACUTE PAIN;SURGICAL PAIN
TYPE: CHRONIC PAIN
TYPE: ACUTE PAIN;SURGICAL PAIN

## 2025-02-18 ASSESSMENT — FIBROSIS 4 INDEX: FIB4 SCORE: 1.63

## 2025-02-18 NOTE — ANESTHESIA PREPROCEDURE EVALUATION
Case: [id number] Date/Time: 02/18/25 1315    Procedures:       ROBOTIC BILATERAL SALPINGO OOPHORECTOMY, POSSIBLE LYSIS OF ADHESIONS, CUFF REVISION, VAGINAL POLYPECTOMY      CLOSURE, VAGINAL CUFF    Pre-op diagnosis: BLEEDING, PAIN    Location: [facility] OR [facility] / SURGERY [facility]    Surgeons: Rula Franco D.O.          49F current every day smoker, 1PPD for 30 years  Relevant Problems   NEURO   (positive) Intractable migraine without aura and with status migrainosus   (positive) Migraine      CARDIAC   (positive) Intractable migraine without aura and with status migrainosus   (positive) Migraine       Physical Exam    Airway   Mallampati: II  TM distance: >3 FB  Neck ROM: full       Cardiovascular - normal exam  Rhythm: regular  Rate: normal  (-) murmur     Dental       Very poor dentition     Pulmonary - normal exam     Abdominal    Neurological - normal exam                   Anesthesia Plan    ASA 2       Plan - general       Airway plan will be ETT          Induction: intravenous    Postoperative Plan: Postoperative administration of opioids is intended.    Pertinent diagnostic labs and testing reviewed    Informed Consent:    Anesthetic plan and risks discussed with patient.    Use of blood products discussed with: patient whom consented to blood products.

## 2025-02-18 NOTE — PROGRESS NOTES
Medication history reviewed with PT at bedside    [facility] rec is complete per PT reporting    Allergies reviewed.     PT was on Metronidazole 500mg 7 day course started 02/06/2025. Last dose was 02/13/2025. Course was completed.    Patient is not taking anticoagulants.    Preferred pharmacy for this visit - [facility] ([phone])

## 2025-02-19 LAB — PATHOLOGY CONSULT NOTE: NORMAL

## 2025-02-19 NOTE — OP REPORT
PREOPERATIVE DIAGNOSES:   49 y.o.  with pelvic pain and vaginal bleeding following previous hysterectomy     POSTOPERATIVE DIAGNOSES:   Same      SURGEON: Rula Franco DO     ASSISTANT: Deann Urbano CFA      PROCEDURES PERFORMED:   Robotic Assisted right salpingo-oophorectomy, left ovarian remnant excision, lysis of adhesions, vaginal polypectomy     ANESTHESIA: General endotracheal anesthesia.      ANESTHESIOLOGIST: Donnie Jensen MD      ESTIMATED BLOOD LOSS: 25 mL      URINE OUTPUT: 300 mL of clear urine at the end of procedure. \     FINDINGS: Normal-appearing external female genitalia, speculum exam there were multiple polypoid lesions at the apex of the vagina.  Upon entry into the peritoneal cavity there is normal-appearing upper abdominal anatomy.  Uterus left tube and ovary are surgically absent.  There is a small left ovarian remnant stuck to left pelvic sidewall and there were adhesions of bowel to left pelvic brim and sidewall.  Right tube and ovary are normal in appearance and adherent to right pelvic sidewall.  Ureters are coursing in normal anatomic position.      COMPLICATIONS: none      PROCEDURE IN DETAIL: Patient is identified in the preoperative area.  Procedure and name are verified.  Informed consent is verified and patient reports all of her questions have been answered to her satisfaction.  The patient was then taken to the operating room and transferred to the OR table in supine position.  Serial compression devices were activated.  General endotracheal anesthesia was induced without difficulty.  The patient was placed in dorsal lithotomy position with her arms tucked at each side with careful attention to pad all flexion points.  A timeout was performed in which the patient's identity and procedures to be performed were all agreed upon by all members present. She was then prepped and draped in the usual sterile fashion. Preoperative antibiotics had been given. Examination  under anesthesia was performed and a Damon catheter was placed under sterile technique.  Vaginal exam was performed and vaginal polyps are noted.  Surgeon then changed gloves and attention was turned to the abdominal portion of the case.   A Veress needle was used to enter the peritoneal cavity with an opening pressure of 4 mmHg.  Pressure was then turned to high flow and the abdomen was insufflated with CO2 gas at a pressure of 15 mmHg and pneumoperitoneum was maintained.  Approximately 5 cc of quarter percent Marcaine was injected into the base of the umbilicus. An umbilical incision was then created to accommodate an 8 millimeter robotic trocar. The 8 millimeter robotic trocar was inserted into the abdomen. The scope was introduced to confirm intraperitoneal placement with no injuries which was confirmed. The remainder of the trocars were then all placed under direct visualization after injection with marcaine. The patient was placed in steep Trendelenburg positioning to assist with visualization of the pelvis and the robot was docked per the routine. The surgeon moved to the console. Bilateral ureters were visualized transperitoneally to be coursing in the normal anatomic position.  The right adnexa was grasped and retracted towards the midline.  An incision was made in the peritoneum lateral to the right infundibulopelvic ligament in a parallel fashion.  The skin center into the retroperitoneal space.  The ureter was directly visualized.  The ureter is mobilized away from the pedicle with blunt dissection through an avascular plane.  A wound was then created under the right ovarian vessels.  The ovarian vessels are fully skeletonized coagulated and then transected.  Hemostasis is maintained.  The ovary is completely dissected off surrounding peritoneum and left in the cul-de-sac of the pelvis.  Attention was then turned to the left side.  Adhesions of bowel are taken down with sharp dissection.  This is  sequentially and carefully performed to fully visualize the left pelvic brim and ensure there is no ovary or fallopian tube.  This was confirmed.  The small left ovarian remnant which was adherent to the left pelvic sidewall is excised with sharp dissection.  This is then removed from the body.  A 5 cm bag is then introduced into the pelvis and the right adnexa was placed within the bag.  The strings are then allowed to drop down into the pelvis.  All surgical sites are confirmed to be hemostatic.  Surgeon then rescrubbed.  The robot is undocked and backed safely away from the patient.  The 5 bag is pulled up through the umbilical incision and the bag with the ovary inside is easily removed.  Skin incisions are all cleaned.  Midline incision is palpated and confirmed to be less than a centimeter.  Dermabond is utilized to close all skin incisions.  Attention was then turned to the vagina.  The speculum was placed into the vagina and the vaginal polyp was grasped with a ring forcep.  This is then excised with Metzenbaum scissors.  This is first done on the largest polyp which is on the left apex.  Other polypoid tissue in the midline and on the right are then grasped and excised.  1 small bleeding edges coagulated to achieve hemostasis.  An 0 Vicryl suture is then utilized to reapproximate the vaginal mucosa in a running fashion.  This is performed in 2 layers.  Excellent reapproximation and hemostasis was achieved.  Damon catheter was then removed from the bladder.      Sponge, needle, instrument, and lap counts were correct x2. Patient tolerated the procedure well and went to recovery room in stable condition.         ____________________________________   DO DESHAWN GilletteOG

## 2025-02-19 NOTE — ANESTHESIA PROCEDURE NOTES
Airway    Date/Time: 2/18/2025 3:53 PM    Performed by: oDnnie Jensen M.D.  Authorized by: Donnie Jensen M.D.    Location:  OR  Urgency:  Elective  Indications for Airway Management:  Anesthesia      Spontaneous Ventilation: absent    Sedation Level:  Deep  Preoxygenated: Yes    Patient Position:  Sniffing  Mask Difficulty Assessment:  1 - vent by mask  Final Airway Type:  Endotracheal airway  Final Endotracheal Airway:  ETT  Cuffed: Yes    Technique Used for Successful ETT Placement:  Direct laryngoscopy    Insertion Site:  Oral  Blade Type:  Pk  Laryngoscope Blade/Videolaryngoscope Blade Size:  4  ETT Size (mm):  7.0  Measured from:  Teeth  ETT to Teeth (cm):  22  Placement Verified by: auscultation and capnometry    Cormack-Lehane Classification:  Grade I - full view of glottis  Number of Attempts at Approach:  1

## 2025-02-19 NOTE — OR NURSING
Pt on RA.  No c/o nausea, tolerating PO fluids, barbara crackers, peanut butter and medication.  X 3 laparoscopic abdominal sites CDI, ice pack in place.  Splinting discussed and return demonstrated. Surgical pain now tolerable per pt, 5/10.  VSS, afebrile, EISENBERG, A/O x4.     Incentive spirometer 2000 ml, effective.     No belongings in PACU.     Pt has prescriptions at home.     Bedside handoff to Keyla CHU.

## 2025-02-19 NOTE — ANESTHESIA POSTPROCEDURE EVALUATION
Patient: Erna Garcia    Procedure Summary       Date: 02/18/25 Room / Location: [place] [address] / SURGERY [facility]    Anesthesia Start: 1544 Anesthesia Stop: 1645    Procedure: ROBOTIC RIGHT SALPINGO OOPHORECTOMY, LEFT OVARIAN REMNANT EXCISION, VAGINAL POLYP REMOVAL (Bilateral: Abdomen) Diagnosis: (BLEEDING, PAIN)    Surgeons: Rula Franco D.O. Responsible Provider: Donnie Jensen M.D.    Anesthesia Type: general ASA Status: 2            Final Anesthesia Type: general  Last vitals  BP   Blood Pressure: 104/65    Temp   35.8 °C (96.5 °F)    Pulse   84   Resp   16    SpO2   95 %      Anesthesia Post Evaluation    Patient location during evaluation: PACU  Patient participation: complete - patient participated  Level of consciousness: awake and alert    Airway patency: patent  Anesthetic complications: no  Cardiovascular status: hemodynamically stable  Respiratory status: acceptable  Hydration status: euvolemic    PONV: none          No notable events documented.     Nurse Pain Score: 4 (NPRS)

## 2025-02-19 NOTE — ANESTHESIA TIME REPORT
Anesthesia Start and Stop Event Times       Date Time Event    2/18/2025 1528 Ready for Procedure     1544 Anesthesia Start     1645 Anesthesia Stop          Responsible Staff  02/18/25      Name Role Begin End    Donnie Jensen M.D. Anesth 1544 1645          Overtime Reason:  no overtime (within assigned shift)    Comments:

## 2025-02-19 NOTE — DISCHARGE INSTRUCTIONS
HOME CARE INSTRUCTIONS    ACTIVITY: Rest and take it easy for the first 24 hours.  A responsible adult is recommended to remain with you during that time.  It is normal to feel sleepy.  We encourage you to not do anything that requires balance, judgment or coordination.    FOR 24 HOURS DO NOT:  Drive, operate machinery or run household appliances.  Drink beer or alcoholic beverages.  Make important decisions or sign legal documents.    SPECIAL INSTRUCTIONS: see above    DIET: To avoid nausea, slowly advance diet as tolerated, avoiding spicy or greasy foods for the first day.  Add more substantial food to your diet according to your physician's instructions.  Babies can be fed formula or breast milk as soon as they are hungry.  INCREASE FLUIDS AND FIBER TO AVOID CONSTIPATION.    SURGICAL DRESSING/BATHING: see above    MEDICATIONS: Resume taking daily medication.  Take prescribed pain medication with food.  If no medication is prescribed, you may take non-aspirin pain medication if needed.  PAIN MEDICATION CAN BE VERY CONSTIPATING.  Take a stool softener or laxative such as senokot, pericolace, or milk of magnesia if needed.    Prescriptions at home.  Last pain medication given at Oxycodone at 5:09 pm.    A follow-up appointment should be arranged with your doctor in 1-2 weeks; call to schedule.    You should CALL YOUR PHYSICIAN if you develop:  Fever greater than 101 degrees F.  Pain not relieved by medication, or persistent nausea or vomiting.  Excessive bleeding (blood soaking through dressing) or unexpected drainage from the wound.  Extreme redness or swelling around the incision site, drainage of pus or foul smelling drainage.  Inability to urinate or empty your bladder within 8 hours.  Problems with breathing or chest pain.    You should call 911 if you develop problems with breathing or chest pain.  If you are unable to contact your doctor or surgical center, you should go to the nearest emergency room or  urgent care center.  Physician's telephone #: [phone]     MILD FLU-LIKE SYMPTOMS ARE NORMAL.  YOU MAY EXPERIENCE GENERALIZED MUSCLE ACHES, THROAT IRRITATION, HEADACHE AND/OR SOME NAUSEA.    If any questions arise, call your doctor.  If your doctor is not available, please feel free to call the Surgical Center at [phone].  The Center is open Monday through Friday from 7AM to 7PM.      A registered nurse may call you a few days after your surgery to see how you are doing after your procedure.    You may also receive a survey in the mail within the next two weeks and we ask that you take a few moments to complete the survey and return it to us.  Our goal is to provide you with very good care and we value your comments.     Depression / Suicide Risk    As you are discharged from this [facility] facility, it is important to learn how to keep safe from harming yourself.    Recognize the warning signs:  Abrupt changes in personality, positive or negative- including increase in energy   Giving away possessions  Change in eating patterns- significant weight changes-  positive or negative  Change in sleeping patterns- unable to sleep or sleeping all the time   Unwillingness or inability to communicate  Depression  Unusual sadness, discouragement and loneliness  Talk of wanting to die  Neglect of personal appearance   Rebelliousness- reckless behavior  Withdrawal from people/activities they love  Confusion- inability to concentrate     If you or a loved one observes any of these behaviors or has concerns about self-harm, here's what you can do:  Talk about it- your feelings and reasons for harming yourself  Remove any means that you might use to hurt yourself (examples: pills, rope, extension cords, firearm)  Get professional help from the community (Mental Health, Substance Abuse, psychological counseling)  Do not be alone:Call your Safe Contact- someone whom you trust who will be there for you.  Call your  local CRISIS HOTLINE 031-5774 or 277-378-8342  Call your local Children's Mobile Crisis Response Team [place] (454) 567-1396 or www.[employer]  Call the toll free National Suicide Prevention Hotlines   National Suicide Prevention Lifeline 933-008-DGEP (0890)  [facility] Line Network 800-SUICIDE (107-4130)    I acknowledge receipt and understanding of these Home Care instructions.

## 2025-08-15 DIAGNOSIS — Z72.0 TOBACCO ABUSE: Primary | ICD-10-CM

## (undated) DEVICE — PACK GYN DAVINCI (1EA/CA)

## (undated) DEVICE — DRESSING NON-ADHERING 8 X 3 - (50/BX)

## (undated) DEVICE — PACK TRENGUARD 450 PROCEDURE (12EA/CA)

## (undated) DEVICE — SUTURE 0 VICRYL PLUS CT-2 - 27 INCH (36/BX)

## (undated) DEVICE — SEAL UNIVERSAL 5MM-12MM (10EA/BX)

## (undated) DEVICE — NEEDLE INSFL 120MM 14GA VRRS - (20/BX)

## (undated) DEVICE — ARMREST CRADLE FOAM - (2PR/PK 12PR/CA)

## (undated) DEVICE — Device

## (undated) DEVICE — CANNULA O2 COMFORT SOFT EAR ADULT 7 FT TUBING (50/CA)

## (undated) DEVICE — CANISTER SUCTION RIGID RED 1500CC (40EA/CA)

## (undated) DEVICE — SET LEADWIRE 5 LEAD BEDSIDE DISPOSABLE ECG (1SET OF 5/EA)

## (undated) DEVICE — PAD OR TABLE DA VINCI 2IN X 20IN X 72IN - (12EA/CA)

## (undated) DEVICE — SLEEVE VASO DVT COMPRESSION CALF MED - (10PR/CA)

## (undated) DEVICE — PAD SANITARY 11IN MAXI IND WRAPPED (12EA/PK 24PK/CA)

## (undated) DEVICE — SODIUM CHL IRRIGATION 0.9% 1000ML (12EA/CA)

## (undated) DEVICE — SYRINGE 10 ML CONTROL LL (25EA/BX 4BX/CA)

## (undated) DEVICE — DRAPE COLUMN BOX OF 20

## (undated) DEVICE — TUBE CONNECTING SUCTION - CLEAR PLASTIC STERILE 72 IN (50EA/CA)

## (undated) DEVICE — KIT  I.V. START (100EA/CA)

## (undated) DEVICE — DRESSING TRANSPARENT FILM TEGADERM 2.375 X 2.75" (100EA/BX)"

## (undated) DEVICE — DA VINCI INSUFFLATOR TUBE SET - SMOKE EVACUATION (6EA/BX)

## (undated) DEVICE — MASK OXYGEN VNYL ADLT MED CONC WITH 7 FOOT TUBING - (50EA/CA)

## (undated) DEVICE — GOWN WARMING STANDARD FLEX - (30/CA)

## (undated) DEVICE — CANISTER SUCTION 3000ML MECHANICAL FILTER AUTO SHUTOFF MEDI-VAC NONSTERILE LF DISP (40EA/CA)

## (undated) DEVICE — OBTURATOR BLADELESS STANDARD 8MM (6EA/BX)

## (undated) DEVICE — COVER LIGHT HANDLE ALC PLUS DISP (18EA/BX)

## (undated) DEVICE — SET EXTENSION WITH 2 PORTS (48EA/CA) ***PART #2C8610 IS A SUBSTITUTE*****

## (undated) DEVICE — SENSOR OXIMETER ADULT SPO2 RD SET (20EA/BX)

## (undated) DEVICE — DRAPE ARM BOX OF 20

## (undated) DEVICE — SHEARS MONOPOLAR CURVED DA VINCI 10X'S REUSABLE

## (undated) DEVICE — LACTATED RINGERS INJ 1000 ML - (14EA/CA 60CA/PF)

## (undated) DEVICE — TOWEL STOP TIMEOUT SAFETY FLAG (40EA/CA)

## (undated) DEVICE — DERMABOND ADVANCED - (12EA/BX)

## (undated) DEVICE — SUCTION INSTRUMENT YANKAUER BULBOUS TIP W/O VENT (50EA/CA)

## (undated) DEVICE — ELECTRODE DUAL RETURN W/ CORD - (50/PK)

## (undated) DEVICE — SUTURE GENERAL

## (undated) DEVICE — TUBING CLEARLINK DUO-VENT - C-FLO (48EA/CA)

## (undated) DEVICE — WATER IRRIGATION STERILE 1000ML (12EA/CA)